# Patient Record
Sex: MALE | Race: WHITE | NOT HISPANIC OR LATINO
[De-identification: names, ages, dates, MRNs, and addresses within clinical notes are randomized per-mention and may not be internally consistent; named-entity substitution may affect disease eponyms.]

---

## 2017-01-06 ENCOUNTER — APPOINTMENT (OUTPATIENT)
Dept: INTERNAL MEDICINE | Facility: CLINIC | Age: 57
End: 2017-01-06

## 2017-01-06 ENCOUNTER — LABORATORY RESULT (OUTPATIENT)
Age: 57
End: 2017-01-06

## 2017-01-11 LAB
AMPHET UR-MCNC: NEGATIVE
BARBITURATES UR-MCNC: NEGATIVE
BENZODIAZ UR-MCNC: NEGATIVE
COCAINE METAB.OTHER UR-MCNC: NEGATIVE
CREATININE, URINE: 130.7 MG/DL
METHADONE UR-MCNC: NEGATIVE
METHAQUALONE UR-MCNC: NEGATIVE
OPIATES UR-MCNC: NEGATIVE
PCP UR-MCNC: NEGATIVE
PROPOXYPH UR QL: NEGATIVE
THC UR QL: NEGATIVE

## 2017-01-12 LAB — SUBOXONE: NORMAL

## 2017-02-08 ENCOUNTER — RX RENEWAL (OUTPATIENT)
Age: 57
End: 2017-02-08

## 2017-02-08 ENCOUNTER — APPOINTMENT (OUTPATIENT)
Dept: INTERNAL MEDICINE | Facility: CLINIC | Age: 57
End: 2017-02-08

## 2017-03-08 ENCOUNTER — APPOINTMENT (OUTPATIENT)
Dept: INTERNAL MEDICINE | Facility: CLINIC | Age: 57
End: 2017-03-08

## 2017-04-04 ENCOUNTER — APPOINTMENT (OUTPATIENT)
Dept: INTERNAL MEDICINE | Facility: CLINIC | Age: 57
End: 2017-04-04

## 2017-05-04 ENCOUNTER — APPOINTMENT (OUTPATIENT)
Dept: INTERNAL MEDICINE | Facility: CLINIC | Age: 57
End: 2017-05-04

## 2017-05-31 ENCOUNTER — LABORATORY RESULT (OUTPATIENT)
Age: 57
End: 2017-05-31

## 2017-05-31 ENCOUNTER — APPOINTMENT (OUTPATIENT)
Dept: INTERNAL MEDICINE | Facility: CLINIC | Age: 57
End: 2017-05-31

## 2017-05-31 RX ORDER — CLARITHROMYCIN 500 MG/1
500 TABLET, FILM COATED ORAL
Qty: 20 | Refills: 0 | Status: DISCONTINUED | COMMUNITY
Start: 2017-02-23

## 2017-05-31 RX ORDER — OSELTAMIVIR PHOSPHATE 75 MG/1
75 CAPSULE ORAL
Qty: 10 | Refills: 0 | Status: DISCONTINUED | COMMUNITY
Start: 2017-02-23

## 2017-06-03 LAB
AMPHET UR-MCNC: NEGATIVE
BARBITURATES UR-MCNC: NEGATIVE
BENZODIAZ UR-MCNC: NEGATIVE
COCAINE METAB.OTHER UR-MCNC: NEGATIVE
CREATININE, URINE: 32.8 MG/DL
METHADONE UR-MCNC: NEGATIVE
METHAQUALONE UR-MCNC: NEGATIVE
OPIATES UR-MCNC: NEGATIVE
PCP UR-MCNC: NEGATIVE
PROPOXYPH UR QL: NEGATIVE
THC UR QL: NEGATIVE

## 2017-06-05 LAB — SUBOXONE: NORMAL

## 2017-06-22 ENCOUNTER — RESULT REVIEW (OUTPATIENT)
Age: 57
End: 2017-06-22

## 2017-06-22 ENCOUNTER — LABORATORY RESULT (OUTPATIENT)
Age: 57
End: 2017-06-22

## 2017-06-22 ENCOUNTER — APPOINTMENT (OUTPATIENT)
Dept: DERMATOLOGY | Facility: CLINIC | Age: 57
End: 2017-06-22

## 2017-06-22 VITALS — WEIGHT: 188 LBS | HEIGHT: 67 IN | BODY MASS INDEX: 29.51 KG/M2

## 2017-06-22 VITALS — SYSTOLIC BLOOD PRESSURE: 130 MMHG | DIASTOLIC BLOOD PRESSURE: 70 MMHG

## 2017-06-27 ENCOUNTER — APPOINTMENT (OUTPATIENT)
Dept: INTERNAL MEDICINE | Facility: CLINIC | Age: 57
End: 2017-06-27

## 2017-07-24 ENCOUNTER — APPOINTMENT (OUTPATIENT)
Dept: INTERNAL MEDICINE | Facility: CLINIC | Age: 57
End: 2017-07-24

## 2017-07-24 RX ORDER — CLINDAMYCIN HYDROCHLORIDE 300 MG/1
300 CAPSULE ORAL
Qty: 28 | Refills: 0 | Status: DISCONTINUED | COMMUNITY
Start: 2017-06-21

## 2017-07-24 RX ORDER — HYDROCODONE BITARTRATE AND ACETAMINOPHEN 7.5; 325 MG/1; MG/1
7.5-325 TABLET ORAL
Qty: 10 | Refills: 0 | Status: DISCONTINUED | COMMUNITY
Start: 2017-06-21

## 2017-08-18 ENCOUNTER — APPOINTMENT (OUTPATIENT)
Dept: INTERNAL MEDICINE | Facility: CLINIC | Age: 57
End: 2017-08-18
Payer: SELF-PAY

## 2017-08-18 PROCEDURE — 99499D: CUSTOM

## 2017-09-15 ENCOUNTER — APPOINTMENT (OUTPATIENT)
Dept: INTERNAL MEDICINE | Facility: CLINIC | Age: 57
End: 2017-09-15
Payer: SELF-PAY

## 2017-09-15 PROCEDURE — 99499D: CUSTOM

## 2017-10-11 ENCOUNTER — APPOINTMENT (OUTPATIENT)
Dept: INTERNAL MEDICINE | Facility: CLINIC | Age: 57
End: 2017-10-11
Payer: SELF-PAY

## 2017-10-11 PROCEDURE — 99499D: CUSTOM

## 2017-11-02 ENCOUNTER — APPOINTMENT (OUTPATIENT)
Dept: INTERNAL MEDICINE | Facility: CLINIC | Age: 57
End: 2017-11-02
Payer: COMMERCIAL

## 2017-11-02 ENCOUNTER — NON-APPOINTMENT (OUTPATIENT)
Age: 57
End: 2017-11-02

## 2017-11-02 VITALS
OXYGEN SATURATION: 98 % | RESPIRATION RATE: 14 BRPM | TEMPERATURE: 98.2 F | WEIGHT: 194 LBS | BODY MASS INDEX: 29.4 KG/M2 | SYSTOLIC BLOOD PRESSURE: 130 MMHG | HEART RATE: 77 BPM | HEIGHT: 68 IN | DIASTOLIC BLOOD PRESSURE: 80 MMHG

## 2017-11-02 PROCEDURE — 93000 ELECTROCARDIOGRAM COMPLETE: CPT

## 2017-11-02 PROCEDURE — 99214 OFFICE O/P EST MOD 30 MIN: CPT

## 2017-11-03 LAB
ALBUMIN SERPL ELPH-MCNC: 4.6 G/DL
ALP BLD-CCNC: 102 U/L
ALT SERPL-CCNC: 25 U/L
ANION GAP SERPL CALC-SCNC: 16 MMOL/L
AST SERPL-CCNC: 26 U/L
BASOPHILS # BLD AUTO: 0.02 K/UL
BASOPHILS NFR BLD AUTO: 0.3 %
BILIRUB SERPL-MCNC: 0.3 MG/DL
BUN SERPL-MCNC: 19 MG/DL
CALCIUM SERPL-MCNC: 9.6 MG/DL
CHLORIDE SERPL-SCNC: 99 MMOL/L
CO2 SERPL-SCNC: 28 MMOL/L
CREAT SERPL-MCNC: 0.95 MG/DL
EOSINOPHIL # BLD AUTO: 0.14 K/UL
EOSINOPHIL NFR BLD AUTO: 2.2 %
GLUCOSE SERPL-MCNC: 100 MG/DL
HCT VFR BLD CALC: 46.3 %
HGB BLD-MCNC: 15.9 G/DL
IMM GRANULOCYTES NFR BLD AUTO: 0.2 %
LYMPHOCYTES # BLD AUTO: 2.59 K/UL
LYMPHOCYTES NFR BLD AUTO: 41.2 %
MAN DIFF?: NORMAL
MCHC RBC-ENTMCNC: 31.5 PG
MCHC RBC-ENTMCNC: 34.3 GM/DL
MCV RBC AUTO: 91.9 FL
MONOCYTES # BLD AUTO: 0.4 K/UL
MONOCYTES NFR BLD AUTO: 6.4 %
NEUTROPHILS # BLD AUTO: 3.13 K/UL
NEUTROPHILS NFR BLD AUTO: 49.7 %
PLATELET # BLD AUTO: 165 K/UL
POTASSIUM SERPL-SCNC: 4.8 MMOL/L
PROT SERPL-MCNC: 7.3 G/DL
RBC # BLD: 5.04 M/UL
RBC # FLD: 13.3 %
SODIUM SERPL-SCNC: 143 MMOL/L
T3 SERPL-MCNC: 130 NG/DL
T3FREE SERPL-MCNC: 3.72 PG/ML
T4 FREE SERPL-MCNC: 1.4 NG/DL
T4 SERPL-MCNC: 8.2 UG/DL
TSH SERPL-ACNC: 3.45 UIU/ML
WBC # FLD AUTO: 6.29 K/UL

## 2017-11-06 ENCOUNTER — RX RENEWAL (OUTPATIENT)
Age: 57
End: 2017-11-06

## 2017-11-06 ENCOUNTER — APPOINTMENT (OUTPATIENT)
Dept: INTERNAL MEDICINE | Facility: CLINIC | Age: 57
End: 2017-11-06
Payer: SELF-PAY

## 2017-11-06 PROCEDURE — 99499D: CUSTOM

## 2017-11-07 ENCOUNTER — NON-APPOINTMENT (OUTPATIENT)
Age: 57
End: 2017-11-07

## 2017-11-07 ENCOUNTER — APPOINTMENT (OUTPATIENT)
Dept: CARDIOLOGY | Facility: CLINIC | Age: 57
End: 2017-11-07
Payer: COMMERCIAL

## 2017-11-07 VITALS
HEIGHT: 68 IN | OXYGEN SATURATION: 99 % | WEIGHT: 194 LBS | DIASTOLIC BLOOD PRESSURE: 90 MMHG | HEART RATE: 65 BPM | SYSTOLIC BLOOD PRESSURE: 168 MMHG | BODY MASS INDEX: 29.4 KG/M2

## 2017-11-07 DIAGNOSIS — S13.4XXA SPRAIN OF LIGAMENTS OF CERVICAL SPINE, INITIAL ENCOUNTER: ICD-10-CM

## 2017-11-07 DIAGNOSIS — K21.9 GASTRO-ESOPHAGEAL REFLUX DISEASE W/OUT ESOPHAGITIS: ICD-10-CM

## 2017-11-07 DIAGNOSIS — J06.9 ACUTE UPPER RESPIRATORY INFECTION, UNSPECIFIED: ICD-10-CM

## 2017-11-07 DIAGNOSIS — D48.5 NEOPLASM OF UNCERTAIN BEHAVIOR OF SKIN: ICD-10-CM

## 2017-11-07 DIAGNOSIS — R03.0 ELEVATED BLOOD-PRESSURE READING, W/OUT DIAGNOSIS OF HYPERTENSION: ICD-10-CM

## 2017-11-07 DIAGNOSIS — R00.2 PALPITATIONS: ICD-10-CM

## 2017-11-07 DIAGNOSIS — B19.20 UNSPECIFIED VIRAL HEPATITIS C W/OUT HEPATIC COMA: ICD-10-CM

## 2017-11-07 DIAGNOSIS — M25.571 PAIN IN RIGHT ANKLE AND JOINTS OF RIGHT FOOT: ICD-10-CM

## 2017-11-07 DIAGNOSIS — F17.290 NICOTINE DEPENDENCE, OTHER TOBACCO PRODUCT, UNCOMPLICATED: ICD-10-CM

## 2017-11-07 PROCEDURE — 93000 ELECTROCARDIOGRAM COMPLETE: CPT

## 2017-11-07 PROCEDURE — 99204 OFFICE O/P NEW MOD 45 MIN: CPT

## 2017-12-04 ENCOUNTER — APPOINTMENT (OUTPATIENT)
Dept: INTERNAL MEDICINE | Facility: CLINIC | Age: 57
End: 2017-12-04
Payer: SELF-PAY

## 2017-12-04 PROCEDURE — 99499D: CUSTOM

## 2017-12-06 ENCOUNTER — APPOINTMENT (OUTPATIENT)
Dept: CARDIOLOGY | Facility: CLINIC | Age: 57
End: 2017-12-06
Payer: COMMERCIAL

## 2017-12-06 PROCEDURE — 93306 TTE W/DOPPLER COMPLETE: CPT

## 2017-12-20 ENCOUNTER — APPOINTMENT (OUTPATIENT)
Dept: CARDIOLOGY | Facility: CLINIC | Age: 57
End: 2017-12-20
Payer: COMMERCIAL

## 2017-12-20 PROCEDURE — 93224 XTRNL ECG REC UP TO 48 HRS: CPT

## 2017-12-21 ENCOUNTER — APPOINTMENT (OUTPATIENT)
Dept: CARDIOLOGY | Facility: CLINIC | Age: 57
End: 2017-12-21
Payer: COMMERCIAL

## 2017-12-21 DIAGNOSIS — R94.39 ABNORMAL RESULT OF OTHER CARDIOVASCULAR FUNCTION STUDY: ICD-10-CM

## 2017-12-21 PROCEDURE — 93015 CV STRESS TEST SUPVJ I&R: CPT

## 2017-12-29 ENCOUNTER — APPOINTMENT (OUTPATIENT)
Dept: INTERNAL MEDICINE | Facility: CLINIC | Age: 57
End: 2017-12-29
Payer: SELF-PAY

## 2017-12-29 PROCEDURE — 99499D: CUSTOM

## 2018-01-12 ENCOUNTER — APPOINTMENT (OUTPATIENT)
Dept: CARDIOLOGY | Facility: CLINIC | Age: 58
End: 2018-01-12
Payer: COMMERCIAL

## 2018-01-12 PROCEDURE — 78452 HT MUSCLE IMAGE SPECT MULT: CPT

## 2018-01-12 PROCEDURE — A9500: CPT

## 2018-01-12 PROCEDURE — 93015 CV STRESS TEST SUPVJ I&R: CPT

## 2018-01-26 ENCOUNTER — APPOINTMENT (OUTPATIENT)
Dept: INTERNAL MEDICINE | Facility: CLINIC | Age: 58
End: 2018-01-26
Payer: SELF-PAY

## 2018-01-26 PROCEDURE — 99499D: CUSTOM

## 2018-02-21 ENCOUNTER — APPOINTMENT (OUTPATIENT)
Dept: INTERNAL MEDICINE | Facility: CLINIC | Age: 58
End: 2018-02-21
Payer: SELF-PAY

## 2018-02-21 PROCEDURE — 99499D: CUSTOM

## 2018-03-20 ENCOUNTER — APPOINTMENT (OUTPATIENT)
Dept: INTERNAL MEDICINE | Facility: CLINIC | Age: 58
End: 2018-03-20
Payer: SELF-PAY

## 2018-03-20 PROCEDURE — 99499D: CUSTOM

## 2018-04-17 ENCOUNTER — APPOINTMENT (OUTPATIENT)
Dept: INTERNAL MEDICINE | Facility: CLINIC | Age: 58
End: 2018-04-17
Payer: SELF-PAY

## 2018-04-17 PROCEDURE — 99499D: CUSTOM

## 2018-05-14 ENCOUNTER — APPOINTMENT (OUTPATIENT)
Dept: INTERNAL MEDICINE | Facility: CLINIC | Age: 58
End: 2018-05-14
Payer: COMMERCIAL

## 2018-05-14 PROCEDURE — 99499D: CUSTOM

## 2018-06-08 ENCOUNTER — APPOINTMENT (OUTPATIENT)
Dept: INTERNAL MEDICINE | Facility: CLINIC | Age: 58
End: 2018-06-08
Payer: SELF-PAY

## 2018-06-08 PROCEDURE — 99499D: CUSTOM

## 2018-06-09 ENCOUNTER — MEDICATION RENEWAL (OUTPATIENT)
Age: 58
End: 2018-06-09

## 2018-06-11 ENCOUNTER — RX RENEWAL (OUTPATIENT)
Age: 58
End: 2018-06-11

## 2018-07-03 ENCOUNTER — APPOINTMENT (OUTPATIENT)
Dept: INTERNAL MEDICINE | Facility: CLINIC | Age: 58
End: 2018-07-03
Payer: COMMERCIAL

## 2018-07-03 VITALS
SYSTOLIC BLOOD PRESSURE: 130 MMHG | RESPIRATION RATE: 14 BRPM | HEIGHT: 68 IN | TEMPERATURE: 98.9 F | OXYGEN SATURATION: 98 % | BODY MASS INDEX: 28.49 KG/M2 | WEIGHT: 188 LBS | DIASTOLIC BLOOD PRESSURE: 90 MMHG | HEART RATE: 72 BPM

## 2018-07-03 DIAGNOSIS — L08.9 LOCAL INFECTION OF THE SKIN AND SUBCUTANEOUS TISSUE, UNSPECIFIED: ICD-10-CM

## 2018-07-03 DIAGNOSIS — A08.4 VIRAL INTESTINAL INFECTION, UNSPECIFIED: ICD-10-CM

## 2018-07-03 DIAGNOSIS — T30.0 BURN OF UNSPECIFIED BODY REGION, UNSPECIFIED DEGREE: ICD-10-CM

## 2018-07-03 PROCEDURE — 99214 OFFICE O/P EST MOD 30 MIN: CPT

## 2018-07-03 NOTE — PHYSICAL EXAM
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
## 2018-07-06 ENCOUNTER — APPOINTMENT (OUTPATIENT)
Dept: INTERNAL MEDICINE | Facility: CLINIC | Age: 58
End: 2018-07-06

## 2018-08-07 ENCOUNTER — APPOINTMENT (OUTPATIENT)
Dept: INTERNAL MEDICINE | Facility: CLINIC | Age: 58
End: 2018-08-07

## 2018-08-08 ENCOUNTER — APPOINTMENT (OUTPATIENT)
Dept: INTERNAL MEDICINE | Facility: CLINIC | Age: 58
End: 2018-08-08
Payer: SELF-PAY

## 2018-08-08 PROCEDURE — 99499D: CUSTOM

## 2018-09-11 ENCOUNTER — APPOINTMENT (OUTPATIENT)
Dept: INTERNAL MEDICINE | Facility: CLINIC | Age: 58
End: 2018-09-11
Payer: SELF-PAY

## 2018-09-11 PROCEDURE — 99499D: CUSTOM

## 2018-10-09 ENCOUNTER — APPOINTMENT (OUTPATIENT)
Dept: INTERNAL MEDICINE | Facility: CLINIC | Age: 58
End: 2018-10-09
Payer: SELF-PAY

## 2018-10-09 PROCEDURE — 99499D: CUSTOM

## 2018-11-14 ENCOUNTER — APPOINTMENT (OUTPATIENT)
Dept: INTERNAL MEDICINE | Facility: CLINIC | Age: 58
End: 2018-11-14
Payer: SELF-PAY

## 2018-11-14 PROCEDURE — 99499D: CUSTOM

## 2018-12-14 ENCOUNTER — APPOINTMENT (OUTPATIENT)
Dept: INTERNAL MEDICINE | Facility: CLINIC | Age: 58
End: 2018-12-14
Payer: SELF-PAY

## 2018-12-14 PROCEDURE — 99499D: CUSTOM

## 2019-01-18 ENCOUNTER — APPOINTMENT (OUTPATIENT)
Dept: INTERNAL MEDICINE | Facility: CLINIC | Age: 59
End: 2019-01-18
Payer: SELF-PAY

## 2019-01-18 PROCEDURE — 99499D: CUSTOM

## 2019-02-21 ENCOUNTER — APPOINTMENT (OUTPATIENT)
Dept: INTERNAL MEDICINE | Facility: CLINIC | Age: 59
End: 2019-02-21
Payer: SELF-PAY

## 2019-02-21 PROCEDURE — 99499D: CUSTOM

## 2019-03-07 ENCOUNTER — APPOINTMENT (OUTPATIENT)
Dept: INTERNAL MEDICINE | Facility: CLINIC | Age: 59
End: 2019-03-07
Payer: COMMERCIAL

## 2019-03-07 VITALS
RESPIRATION RATE: 14 BRPM | BODY MASS INDEX: 29.86 KG/M2 | DIASTOLIC BLOOD PRESSURE: 80 MMHG | OXYGEN SATURATION: 98 % | HEART RATE: 66 BPM | WEIGHT: 197 LBS | HEIGHT: 68 IN | SYSTOLIC BLOOD PRESSURE: 130 MMHG | TEMPERATURE: 97.9 F

## 2019-03-07 DIAGNOSIS — J01.00 ACUTE MAXILLARY SINUSITIS, UNSPECIFIED: ICD-10-CM

## 2019-03-07 DIAGNOSIS — Z00.00 ENCOUNTER FOR GENERAL ADULT MEDICAL EXAMINATION W/OUT ABNORMAL FINDINGS: ICD-10-CM

## 2019-03-07 DIAGNOSIS — R68.83 CHILLS (WITHOUT FEVER): ICD-10-CM

## 2019-03-07 LAB
FLUAV SPEC QL CULT: NORMAL
FLUBV AG SPEC QL IA: NORMAL
S PYO AG SPEC QL IA: NORMAL

## 2019-03-07 PROCEDURE — 99214 OFFICE O/P EST MOD 30 MIN: CPT | Mod: 25

## 2019-03-07 PROCEDURE — 87804 INFLUENZA ASSAY W/OPTIC: CPT | Mod: QW

## 2019-03-07 PROCEDURE — 87880 STREP A ASSAY W/OPTIC: CPT | Mod: QW

## 2019-03-07 NOTE — HISTORY OF PRESENT ILLNESS
[FreeTextEntry8] : Pt c/o body aches, chills, sore throat, cough, head congestion, sinus pressure, chest congestion for 2 days.

## 2019-03-21 ENCOUNTER — APPOINTMENT (OUTPATIENT)
Dept: INTERNAL MEDICINE | Facility: CLINIC | Age: 59
End: 2019-03-21
Payer: SELF-PAY

## 2019-03-21 PROCEDURE — 99499D: CUSTOM

## 2019-04-25 ENCOUNTER — APPOINTMENT (OUTPATIENT)
Dept: INTERNAL MEDICINE | Facility: CLINIC | Age: 59
End: 2019-04-25
Payer: SELF-PAY

## 2019-04-25 PROCEDURE — 99499D: CUSTOM

## 2019-05-24 ENCOUNTER — APPOINTMENT (OUTPATIENT)
Dept: INTERNAL MEDICINE | Facility: CLINIC | Age: 59
End: 2019-05-24
Payer: SELF-PAY

## 2019-05-24 ENCOUNTER — LABORATORY RESULT (OUTPATIENT)
Age: 59
End: 2019-05-24

## 2019-05-24 DIAGNOSIS — Z12.5 ENCOUNTER FOR SCREENING FOR MALIGNANT NEOPLASM OF PROSTATE: ICD-10-CM

## 2019-05-24 PROCEDURE — 99499D: CUSTOM

## 2019-05-25 LAB — PSA SERPL-MCNC: 0.23 NG/ML

## 2019-05-31 LAB — SUBOXONE: NORMAL

## 2019-06-04 LAB
AMPHET UR-MCNC: NEGATIVE
BARBITURATES UR-MCNC: NEGATIVE
BENZODIAZ UR-MCNC: NEGATIVE
COCAINE METAB.OTHER UR-MCNC: NEGATIVE
CREATININE, URINE: 110.7 MG/DL
METHADONE UR-MCNC: NORMAL
METHAQUALONE UR-MCNC: NEGATIVE
OPIATES UR-MCNC: NEGATIVE
PCP UR-MCNC: NEGATIVE
PROPOXYPH UR QL: NEGATIVE
THC UR QL: NEGATIVE

## 2019-06-25 ENCOUNTER — APPOINTMENT (OUTPATIENT)
Dept: INTERNAL MEDICINE | Facility: CLINIC | Age: 59
End: 2019-06-25
Payer: SELF-PAY

## 2019-06-25 PROCEDURE — 99499D: CUSTOM

## 2019-07-29 ENCOUNTER — APPOINTMENT (OUTPATIENT)
Dept: INTERNAL MEDICINE | Facility: CLINIC | Age: 59
End: 2019-07-29
Payer: SELF-PAY

## 2019-07-29 PROCEDURE — 99499D: CUSTOM

## 2019-07-29 RX ORDER — LEVOFLOXACIN 500 MG/1
500 TABLET, FILM COATED ORAL DAILY
Qty: 10 | Refills: 0 | Status: DISCONTINUED | COMMUNITY
Start: 2018-07-03 | End: 2019-07-29

## 2019-07-29 RX ORDER — PREDNISONE 20 MG/1
20 TABLET ORAL
Qty: 10 | Refills: 0 | Status: DISCONTINUED | COMMUNITY
Start: 2019-03-07 | End: 2019-07-29

## 2019-07-29 RX ORDER — CLARITHROMYCIN 500 MG/1
500 TABLET, FILM COATED ORAL TWICE DAILY
Qty: 20 | Refills: 0 | Status: DISCONTINUED | COMMUNITY
Start: 2019-03-07 | End: 2019-07-29

## 2019-07-29 RX ORDER — SILVER SULFADIAZINE 10 MG/G
1 CREAM TOPICAL TWICE DAILY
Qty: 1 | Refills: 1 | Status: DISCONTINUED | COMMUNITY
Start: 2018-07-03 | End: 2019-07-29

## 2019-08-27 ENCOUNTER — APPOINTMENT (OUTPATIENT)
Dept: INTERNAL MEDICINE | Facility: CLINIC | Age: 59
End: 2019-08-27
Payer: SELF-PAY

## 2019-08-27 PROCEDURE — 99499D: CUSTOM

## 2019-09-26 ENCOUNTER — APPOINTMENT (OUTPATIENT)
Dept: INTERNAL MEDICINE | Facility: CLINIC | Age: 59
End: 2019-09-26
Payer: SELF-PAY

## 2019-09-26 ENCOUNTER — RX RENEWAL (OUTPATIENT)
Age: 59
End: 2019-09-26

## 2019-09-26 PROCEDURE — 99499D: CUSTOM

## 2019-10-25 ENCOUNTER — APPOINTMENT (OUTPATIENT)
Dept: INTERNAL MEDICINE | Facility: CLINIC | Age: 59
End: 2019-10-25
Payer: SELF-PAY

## 2019-10-25 PROCEDURE — 99499D: CUSTOM

## 2019-11-26 ENCOUNTER — APPOINTMENT (OUTPATIENT)
Dept: INTERNAL MEDICINE | Facility: CLINIC | Age: 59
End: 2019-11-26
Payer: SELF-PAY

## 2019-11-26 PROCEDURE — 99499D: CUSTOM

## 2019-12-27 ENCOUNTER — APPOINTMENT (OUTPATIENT)
Dept: INTERNAL MEDICINE | Facility: CLINIC | Age: 59
End: 2019-12-27
Payer: SELF-PAY

## 2019-12-27 PROCEDURE — 99499D: CUSTOM

## 2020-01-07 ENCOUNTER — APPOINTMENT (OUTPATIENT)
Dept: INTERNAL MEDICINE | Facility: CLINIC | Age: 60
End: 2020-01-07
Payer: COMMERCIAL

## 2020-01-07 VITALS
BODY MASS INDEX: 29.95 KG/M2 | HEART RATE: 70 BPM | RESPIRATION RATE: 14 BRPM | TEMPERATURE: 98.1 F | OXYGEN SATURATION: 98 % | DIASTOLIC BLOOD PRESSURE: 80 MMHG | SYSTOLIC BLOOD PRESSURE: 120 MMHG | WEIGHT: 197 LBS

## 2020-01-07 DIAGNOSIS — R52 PAIN, UNSPECIFIED: ICD-10-CM

## 2020-01-07 DIAGNOSIS — J02.9 ACUTE PHARYNGITIS, UNSPECIFIED: ICD-10-CM

## 2020-01-07 DIAGNOSIS — J06.9 ACUTE UPPER RESPIRATORY INFECTION, UNSPECIFIED: ICD-10-CM

## 2020-01-07 PROCEDURE — 99214 OFFICE O/P EST MOD 30 MIN: CPT

## 2020-01-07 NOTE — PHYSICAL EXAM
[No Acute Distress] : no acute distress [Well Nourished] : well nourished [Well-Appearing] : well-appearing [Well Developed] : well developed [Normal Sclera/Conjunctiva] : normal sclera/conjunctiva [PERRL] : pupils equal round and reactive to light [EOMI] : extraocular movements intact [Normal Outer Ear/Nose] : the outer ears and nose were normal in appearance [Normal Oropharynx] : the oropharynx was normal [No JVD] : no jugular venous distention [No Lymphadenopathy] : no lymphadenopathy [Supple] : supple [Thyroid Normal, No Nodules] : the thyroid was normal and there were no nodules present [No Respiratory Distress] : no respiratory distress  [No Accessory Muscle Use] : no accessory muscle use [Clear to Auscultation] : lungs were clear to auscultation bilaterally [Normal Rate] : normal rate  [Regular Rhythm] : with a regular rhythm [Normal S1, S2] : normal S1 and S2 [No Murmur] : no murmur heard [No Carotid Bruits] : no carotid bruits [No Abdominal Bruit] : a ~M bruit was not heard ~T in the abdomen [No Varicosities] : no varicosities [Pedal Pulses Present] : the pedal pulses are present [No Edema] : there was no peripheral edema [No Palpable Aorta] : no palpable aorta [No Extremity Clubbing/Cyanosis] : no extremity clubbing/cyanosis [Soft] : abdomen soft [Non Tender] : non-tender [No Masses] : no abdominal mass palpated [Non-distended] : non-distended [No HSM] : no HSM [Normal Bowel Sounds] : normal bowel sounds [Normal Posterior Cervical Nodes] : no posterior cervical lymphadenopathy [Normal Anterior Cervical Nodes] : no anterior cervical lymphadenopathy [No CVA Tenderness] : no CVA  tenderness [No Spinal Tenderness] : no spinal tenderness [Grossly Normal Strength/Tone] : grossly normal strength/tone [No Joint Swelling] : no joint swelling [No Rash] : no rash [Coordination Grossly Intact] : coordination grossly intact [No Focal Deficits] : no focal deficits [Deep Tendon Reflexes (DTR)] : deep tendon reflexes were 2+ and symmetric [Normal Gait] : normal gait [Normal Insight/Judgement] : insight and judgment were intact [Normal Affect] : the affect was normal

## 2020-01-28 ENCOUNTER — APPOINTMENT (OUTPATIENT)
Dept: INTERNAL MEDICINE | Facility: CLINIC | Age: 60
End: 2020-01-28
Payer: SELF-PAY

## 2020-01-28 PROCEDURE — 99499D: CUSTOM

## 2020-02-25 ENCOUNTER — APPOINTMENT (OUTPATIENT)
Dept: INTERNAL MEDICINE | Facility: CLINIC | Age: 60
End: 2020-02-25

## 2020-02-26 ENCOUNTER — APPOINTMENT (OUTPATIENT)
Dept: INTERNAL MEDICINE | Facility: CLINIC | Age: 60
End: 2020-02-26
Payer: SELF-PAY

## 2020-02-26 PROCEDURE — 99499D: CUSTOM

## 2020-03-24 ENCOUNTER — APPOINTMENT (OUTPATIENT)
Dept: INTERNAL MEDICINE | Facility: CLINIC | Age: 60
End: 2020-03-24
Payer: SELF-PAY

## 2020-03-24 PROCEDURE — 99499D: CUSTOM

## 2020-04-22 ENCOUNTER — APPOINTMENT (OUTPATIENT)
Dept: INTERNAL MEDICINE | Facility: CLINIC | Age: 60
End: 2020-04-22
Payer: SELF-PAY

## 2020-04-22 PROCEDURE — 99499D: CUSTOM

## 2020-05-22 ENCOUNTER — APPOINTMENT (OUTPATIENT)
Dept: INTERNAL MEDICINE | Facility: CLINIC | Age: 60
End: 2020-05-22
Payer: SELF-PAY

## 2020-05-22 PROCEDURE — 99499D: CUSTOM

## 2020-06-30 ENCOUNTER — APPOINTMENT (OUTPATIENT)
Dept: INTERNAL MEDICINE | Facility: CLINIC | Age: 60
End: 2020-06-30
Payer: SELF-PAY

## 2020-06-30 PROCEDURE — 99499D: CUSTOM

## 2020-07-27 ENCOUNTER — APPOINTMENT (OUTPATIENT)
Dept: INTERNAL MEDICINE | Facility: CLINIC | Age: 60
End: 2020-07-27
Payer: SELF-PAY

## 2020-07-27 PROCEDURE — 99499D: CUSTOM

## 2020-08-25 ENCOUNTER — APPOINTMENT (OUTPATIENT)
Dept: INTERNAL MEDICINE | Facility: CLINIC | Age: 60
End: 2020-08-25
Payer: SELF-PAY

## 2020-08-25 PROCEDURE — 99499D: CUSTOM

## 2020-08-29 ENCOUNTER — RX RENEWAL (OUTPATIENT)
Age: 60
End: 2020-08-29

## 2020-09-29 ENCOUNTER — APPOINTMENT (OUTPATIENT)
Dept: INTERNAL MEDICINE | Facility: CLINIC | Age: 60
End: 2020-09-29
Payer: SELF-PAY

## 2020-09-29 PROCEDURE — 99499D: CUSTOM

## 2020-10-27 ENCOUNTER — APPOINTMENT (OUTPATIENT)
Dept: INTERNAL MEDICINE | Facility: CLINIC | Age: 60
End: 2020-10-27
Payer: SELF-PAY

## 2020-10-27 PROCEDURE — 99499D: CUSTOM

## 2020-11-30 ENCOUNTER — APPOINTMENT (OUTPATIENT)
Dept: INTERNAL MEDICINE | Facility: CLINIC | Age: 60
End: 2020-11-30
Payer: SELF-PAY

## 2020-11-30 PROCEDURE — 99499D: CUSTOM

## 2020-11-30 RX ORDER — AZITHROMYCIN 250 MG/1
250 TABLET, FILM COATED ORAL
Qty: 1 | Refills: 0 | Status: DISCONTINUED | COMMUNITY
Start: 2020-01-07 | End: 2020-11-30

## 2020-11-30 RX ORDER — BENZONATATE 200 MG/1
200 CAPSULE ORAL 3 TIMES DAILY
Qty: 30 | Refills: 0 | Status: DISCONTINUED | COMMUNITY
Start: 2020-01-07 | End: 2020-11-30

## 2020-12-01 ENCOUNTER — APPOINTMENT (OUTPATIENT)
Dept: INTERNAL MEDICINE | Facility: CLINIC | Age: 60
End: 2020-12-01
Payer: COMMERCIAL

## 2020-12-01 VITALS
RESPIRATION RATE: 14 BRPM | TEMPERATURE: 97.4 F | HEART RATE: 76 BPM | OXYGEN SATURATION: 98 % | DIASTOLIC BLOOD PRESSURE: 84 MMHG | SYSTOLIC BLOOD PRESSURE: 154 MMHG | BODY MASS INDEX: 29.8 KG/M2 | WEIGHT: 196 LBS

## 2020-12-01 DIAGNOSIS — Z23 ENCOUNTER FOR IMMUNIZATION: ICD-10-CM
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
## 2020-12-01 PROCEDURE — 90715 TDAP VACCINE 7 YRS/> IM: CPT

## 2020-12-01 PROCEDURE — 99072 ADDL SUPL MATRL&STAF TM PHE: CPT

## 2020-12-01 PROCEDURE — 99214 OFFICE O/P EST MOD 30 MIN: CPT | Mod: 25

## 2020-12-01 PROCEDURE — 90471 IMMUNIZATION ADMIN: CPT

## 2020-12-01 NOTE — PHYSICAL EXAM
[No Acute Distress] : no acute distress [Well Nourished] : well nourished [Well Developed] : well developed [Well-Appearing] : well-appearing [Normal Sclera/Conjunctiva] : normal sclera/conjunctiva [PERRL] : pupils equal round and reactive to light [EOMI] : extraocular movements intact [Normal Outer Ear/Nose] : the outer ears and nose were normal in appearance [Normal Oropharynx] : the oropharynx was normal [No JVD] : no jugular venous distention [No Lymphadenopathy] : no lymphadenopathy [Supple] : supple [Thyroid Normal, No Nodules] : the thyroid was normal and there were no nodules present [No Respiratory Distress] : no respiratory distress  [No Accessory Muscle Use] : no accessory muscle use [Clear to Auscultation] : lungs were clear to auscultation bilaterally [Normal Rate] : normal rate  [Regular Rhythm] : with a regular rhythm [Normal S1, S2] : normal S1 and S2 [No Murmur] : no murmur heard [No Carotid Bruits] : no carotid bruits [No Abdominal Bruit] : a ~M bruit was not heard ~T in the abdomen [No Varicosities] : no varicosities [Pedal Pulses Present] : the pedal pulses are present [No Edema] : there was no peripheral edema [No Palpable Aorta] : no palpable aorta [No Extremity Clubbing/Cyanosis] : no extremity clubbing/cyanosis [Soft] : abdomen soft [Non Tender] : non-tender [Non-distended] : non-distended [No Masses] : no abdominal mass palpated [No HSM] : no HSM [Normal Bowel Sounds] : normal bowel sounds [Normal Posterior Cervical Nodes] : no posterior cervical lymphadenopathy [Normal Anterior Cervical Nodes] : no anterior cervical lymphadenopathy [No CVA Tenderness] : no CVA  tenderness [No Spinal Tenderness] : no spinal tenderness [Grossly Normal Strength/Tone] : grossly normal strength/tone [No Rash] : no rash [Coordination Grossly Intact] : coordination grossly intact [No Focal Deficits] : no focal deficits [Normal Gait] : normal gait [Normal Affect] : the affect was normal [Normal Insight/Judgement] : insight and judgment were intact [de-identified] : Left hand with bite marks and mild swelling, erythema

## 2020-12-01 NOTE — HISTORY OF PRESENT ILLNESS
[FreeTextEntry8] : Pt was bit on the left hand by his own dog, a pit bull, last night. Pt reports that the dog bit down violently on his hand. He felt like the dog hit the bone on his left fourth digit. No fever. Pt reports that his dog is up to date on vaccinations,

## 2020-12-14 ENCOUNTER — APPOINTMENT (OUTPATIENT)
Dept: ORTHOPEDIC SURGERY | Facility: CLINIC | Age: 60
End: 2020-12-14

## 2020-12-23 PROBLEM — J02.9 ACUTE SORE THROAT: Status: RESOLVED | Noted: 2019-03-07 | Resolved: 2020-12-23

## 2020-12-23 PROBLEM — J06.9 UPPER RESPIRATORY INFECTION, ACUTE: Status: RESOLVED | Noted: 2020-01-07 | Resolved: 2020-12-23

## 2020-12-29 ENCOUNTER — APPOINTMENT (OUTPATIENT)
Dept: INTERNAL MEDICINE | Facility: CLINIC | Age: 60
End: 2020-12-29
Payer: SELF-PAY

## 2020-12-29 PROCEDURE — 99499D: CUSTOM

## 2021-01-30 ENCOUNTER — APPOINTMENT (OUTPATIENT)
Dept: INTERNAL MEDICINE | Facility: CLINIC | Age: 61
End: 2021-01-30
Payer: SELF-PAY

## 2021-01-30 PROCEDURE — 99499D: CUSTOM

## 2021-02-23 ENCOUNTER — APPOINTMENT (OUTPATIENT)
Dept: INTERNAL MEDICINE | Facility: CLINIC | Age: 61
End: 2021-02-23
Payer: SELF-PAY

## 2021-02-23 PROCEDURE — 99499D: CUSTOM

## 2021-03-31 ENCOUNTER — APPOINTMENT (OUTPATIENT)
Dept: INTERNAL MEDICINE | Facility: CLINIC | Age: 61
End: 2021-03-31
Payer: SELF-PAY

## 2021-03-31 PROCEDURE — 99499D: CUSTOM

## 2021-04-30 ENCOUNTER — APPOINTMENT (OUTPATIENT)
Dept: INTERNAL MEDICINE | Facility: CLINIC | Age: 61
End: 2021-04-30
Payer: SELF-PAY

## 2021-04-30 PROCEDURE — 99499D: CUSTOM

## 2021-06-02 ENCOUNTER — APPOINTMENT (OUTPATIENT)
Dept: INTERNAL MEDICINE | Facility: CLINIC | Age: 61
End: 2021-06-02
Payer: SELF-PAY

## 2021-06-02 PROCEDURE — 99499D: CUSTOM

## 2021-07-02 ENCOUNTER — APPOINTMENT (OUTPATIENT)
Dept: INTERNAL MEDICINE | Facility: CLINIC | Age: 61
End: 2021-07-02
Payer: SELF-PAY

## 2021-07-02 DIAGNOSIS — R21 RASH AND OTHER NONSPECIFIC SKIN ERUPTION: ICD-10-CM

## 2021-07-02 PROCEDURE — 99499D: CUSTOM

## 2021-07-02 RX ORDER — CLOTRIMAZOLE AND BETAMETHASONE DIPROPIONATE 10; .5 MG/G; MG/G
1-0.05 CREAM TOPICAL TWICE DAILY
Qty: 1 | Refills: 2 | Status: ACTIVE | COMMUNITY
Start: 2021-07-02 | End: 1900-01-01

## 2021-08-02 ENCOUNTER — APPOINTMENT (OUTPATIENT)
Dept: INTERNAL MEDICINE | Facility: CLINIC | Age: 61
End: 2021-08-02
Payer: SELF-PAY

## 2021-08-02 PROCEDURE — 99499D: CUSTOM

## 2021-08-29 ENCOUNTER — RX RENEWAL (OUTPATIENT)
Age: 61
End: 2021-08-29

## 2021-08-31 ENCOUNTER — APPOINTMENT (OUTPATIENT)
Dept: INTERNAL MEDICINE | Facility: CLINIC | Age: 61
End: 2021-08-31
Payer: SELF-PAY

## 2021-08-31 PROCEDURE — 99499D: CUSTOM

## 2021-09-29 ENCOUNTER — APPOINTMENT (OUTPATIENT)
Dept: INTERNAL MEDICINE | Facility: CLINIC | Age: 61
End: 2021-09-29
Payer: SELF-PAY

## 2021-09-29 PROCEDURE — 99499D: CUSTOM

## 2021-11-05 ENCOUNTER — APPOINTMENT (OUTPATIENT)
Dept: INTERNAL MEDICINE | Facility: CLINIC | Age: 61
End: 2021-11-05
Payer: SELF-PAY

## 2021-11-05 PROCEDURE — 99499D: CUSTOM

## 2021-12-02 ENCOUNTER — RX RENEWAL (OUTPATIENT)
Age: 61
End: 2021-12-02

## 2021-12-03 ENCOUNTER — APPOINTMENT (OUTPATIENT)
Dept: INTERNAL MEDICINE | Facility: CLINIC | Age: 61
End: 2021-12-03
Payer: SELF-PAY

## 2021-12-03 PROCEDURE — 99499D: CUSTOM

## 2022-01-04 ENCOUNTER — APPOINTMENT (OUTPATIENT)
Dept: INTERNAL MEDICINE | Facility: CLINIC | Age: 62
End: 2022-01-04
Payer: SELF-PAY

## 2022-01-04 PROCEDURE — 99499D: CUSTOM

## 2022-02-02 ENCOUNTER — APPOINTMENT (OUTPATIENT)
Dept: INTERNAL MEDICINE | Facility: CLINIC | Age: 62
End: 2022-02-02
Payer: SELF-PAY

## 2022-02-02 PROCEDURE — 99499D: CUSTOM

## 2022-03-03 ENCOUNTER — APPOINTMENT (OUTPATIENT)
Dept: INTERNAL MEDICINE | Facility: CLINIC | Age: 62
End: 2022-03-03
Payer: SELF-PAY

## 2022-03-03 PROCEDURE — 99499D: CUSTOM

## 2022-04-05 ENCOUNTER — APPOINTMENT (OUTPATIENT)
Dept: INTERNAL MEDICINE | Facility: CLINIC | Age: 62
End: 2022-04-05
Payer: SELF-PAY

## 2022-04-05 ENCOUNTER — LABORATORY RESULT (OUTPATIENT)
Age: 62
End: 2022-04-05

## 2022-04-05 DIAGNOSIS — F11.20 OPIOID DEPENDENCE, UNCOMPLICATED: ICD-10-CM

## 2022-04-05 PROCEDURE — 99499D: CUSTOM

## 2022-04-07 DIAGNOSIS — E55.9 VITAMIN D DEFICIENCY, UNSPECIFIED: ICD-10-CM

## 2022-04-07 LAB
25(OH)D3 SERPL-MCNC: 26 NG/ML
ALBUMIN SERPL ELPH-MCNC: 4.8 G/DL
ALP BLD-CCNC: 99 U/L
ALT SERPL-CCNC: 27 U/L
ANION GAP SERPL CALC-SCNC: 14 MMOL/L
APPEARANCE: CLEAR
AST SERPL-CCNC: 25 U/L
BACTERIA: NEGATIVE
BASOPHILS # BLD AUTO: 0.03 K/UL
BASOPHILS NFR BLD AUTO: 0.5 %
BILIRUB SERPL-MCNC: 0.3 MG/DL
BILIRUBIN URINE: NEGATIVE
BLOOD URINE: NEGATIVE
BUN SERPL-MCNC: 19 MG/DL
CALCIUM SERPL-MCNC: 9.3 MG/DL
CHLORIDE SERPL-SCNC: 103 MMOL/L
CHOLEST SERPL-MCNC: 202 MG/DL
CK SERPL-CCNC: 178 U/L
CO2 SERPL-SCNC: 26 MMOL/L
COLOR: YELLOW
CREAT SERPL-MCNC: 0.88 MG/DL
EGFR: 97 ML/MIN/1.73M2
EOSINOPHIL # BLD AUTO: 0.12 K/UL
EOSINOPHIL NFR BLD AUTO: 2 %
ESTIMATED AVERAGE GLUCOSE: 111 MG/DL
GLUCOSE QUALITATIVE U: NEGATIVE
GLUCOSE SERPL-MCNC: 133 MG/DL
HBA1C MFR BLD HPLC: 5.5 %
HCT VFR BLD CALC: 47.1 %
HDLC SERPL-MCNC: 43 MG/DL
HGB BLD-MCNC: 15.8 G/DL
HYALINE CASTS: 0 /LPF
IMM GRANULOCYTES NFR BLD AUTO: 0.2 %
KETONES URINE: NEGATIVE
LDLC SERPL CALC-MCNC: 126 MG/DL
LEUKOCYTE ESTERASE URINE: NEGATIVE
LYMPHOCYTES # BLD AUTO: 1.66 K/UL
LYMPHOCYTES NFR BLD AUTO: 28 %
MAN DIFF?: NORMAL
MCHC RBC-ENTMCNC: 31.5 PG
MCHC RBC-ENTMCNC: 33.5 GM/DL
MCV RBC AUTO: 93.8 FL
MICROSCOPIC-UA: NORMAL
MONOCYTES # BLD AUTO: 0.54 K/UL
MONOCYTES NFR BLD AUTO: 9.1 %
NEUTROPHILS # BLD AUTO: 3.57 K/UL
NEUTROPHILS NFR BLD AUTO: 60.2 %
NITRITE URINE: NEGATIVE
NONHDLC SERPL-MCNC: 159 MG/DL
PH URINE: 6
PLATELET # BLD AUTO: 209 K/UL
POTASSIUM SERPL-SCNC: 4.3 MMOL/L
PROT SERPL-MCNC: 7.3 G/DL
PROTEIN URINE: NORMAL
PSA SERPL-MCNC: 0.31 NG/ML
RBC # BLD: 5.02 M/UL
RBC # FLD: 12.6 %
RED BLOOD CELLS URINE: 4 /HPF
SODIUM SERPL-SCNC: 143 MMOL/L
SPECIFIC GRAVITY URINE: 1.03
SQUAMOUS EPITHELIAL CELLS: 1 /HPF
TRIGL SERPL-MCNC: 165 MG/DL
TSH SERPL-ACNC: 2.06 UIU/ML
UROBILINOGEN URINE: ABNORMAL
WBC # FLD AUTO: 5.93 K/UL
WHITE BLOOD CELLS URINE: 1 /HPF

## 2022-04-07 RX ORDER — MULTIVIT-MIN/FOLIC/VIT K/LYCOP 400-300MCG
25 MCG TABLET ORAL
Qty: 100 | Refills: 1 | Status: ACTIVE | COMMUNITY

## 2022-04-08 LAB
AMPHET UR-MCNC: NEGATIVE
BARBITURATES UR-MCNC: NEGATIVE
BENZODIAZ UR-MCNC: NEGATIVE
COCAINE METAB.OTHER UR-MCNC: NEGATIVE
CREATININE, URINE: 213.4 MG/DL
METHADONE UR-MCNC: NEGATIVE
METHAQUALONE UR-MCNC: NEGATIVE
OPIATES UR-MCNC: NEGATIVE
PCP UR-MCNC: NEGATIVE
PROPOXYPH UR QL: NEGATIVE
THC UR QL: NEGATIVE

## 2022-04-19 LAB — SUBOXONE: NORMAL

## 2022-05-04 ENCOUNTER — APPOINTMENT (OUTPATIENT)
Dept: INTERNAL MEDICINE | Facility: CLINIC | Age: 62
End: 2022-05-04
Payer: SELF-PAY

## 2022-05-04 PROCEDURE — 99499D: CUSTOM

## 2022-06-07 ENCOUNTER — APPOINTMENT (OUTPATIENT)
Dept: INTERNAL MEDICINE | Facility: CLINIC | Age: 62
End: 2022-06-07

## 2022-06-08 ENCOUNTER — APPOINTMENT (OUTPATIENT)
Dept: INTERNAL MEDICINE | Facility: CLINIC | Age: 62
End: 2022-06-08
Payer: SELF-PAY

## 2022-06-08 PROCEDURE — 99499D: CUSTOM

## 2022-07-05 ENCOUNTER — APPOINTMENT (OUTPATIENT)
Dept: INTERNAL MEDICINE | Facility: CLINIC | Age: 62
End: 2022-07-05

## 2022-07-05 PROCEDURE — 99499D: CUSTOM

## 2022-08-05 ENCOUNTER — APPOINTMENT (OUTPATIENT)
Dept: INTERNAL MEDICINE | Facility: CLINIC | Age: 62
End: 2022-08-05

## 2022-08-05 PROCEDURE — 99499D: CUSTOM

## 2022-09-06 ENCOUNTER — APPOINTMENT (OUTPATIENT)
Dept: INTERNAL MEDICINE | Facility: CLINIC | Age: 62
End: 2022-09-06

## 2022-09-06 PROCEDURE — 99499D: CUSTOM

## 2022-10-04 ENCOUNTER — APPOINTMENT (OUTPATIENT)
Dept: INTERNAL MEDICINE | Facility: CLINIC | Age: 62
End: 2022-10-04

## 2022-10-04 PROCEDURE — 99499D: CUSTOM

## 2022-11-02 ENCOUNTER — APPOINTMENT (OUTPATIENT)
Dept: INTERNAL MEDICINE | Facility: CLINIC | Age: 62
End: 2022-11-02

## 2022-11-02 PROCEDURE — 99499D: CUSTOM

## 2022-11-28 ENCOUNTER — EMERGENCY (EMERGENCY)
Facility: HOSPITAL | Age: 62
LOS: 1 days | Discharge: ANOTHER HOSPITAL | End: 2022-11-28
Attending: STUDENT IN AN ORGANIZED HEALTH CARE EDUCATION/TRAINING PROGRAM | Admitting: STUDENT IN AN ORGANIZED HEALTH CARE EDUCATION/TRAINING PROGRAM
Payer: COMMERCIAL

## 2022-11-28 ENCOUNTER — NON-APPOINTMENT (OUTPATIENT)
Age: 62
End: 2022-11-28

## 2022-11-28 ENCOUNTER — INPATIENT (INPATIENT)
Facility: HOSPITAL | Age: 62
LOS: 1 days | Discharge: HOME | DRG: 246 | End: 2022-11-30
Attending: INTERNAL MEDICINE | Admitting: INTERNAL MEDICINE
Payer: COMMERCIAL

## 2022-11-28 VITALS
HEART RATE: 78 BPM | RESPIRATION RATE: 15 BRPM | SYSTOLIC BLOOD PRESSURE: 174 MMHG | WEIGHT: 190.04 LBS | DIASTOLIC BLOOD PRESSURE: 98 MMHG | OXYGEN SATURATION: 97 % | HEIGHT: 67 IN | TEMPERATURE: 99 F

## 2022-11-28 VITALS
OXYGEN SATURATION: 98 % | SYSTOLIC BLOOD PRESSURE: 166 MMHG | RESPIRATION RATE: 15 BRPM | TEMPERATURE: 99 F | DIASTOLIC BLOOD PRESSURE: 77 MMHG | HEART RATE: 75 BPM

## 2022-11-28 VITALS — HEIGHT: 68 IN | WEIGHT: 190.04 LBS

## 2022-11-28 DIAGNOSIS — I21.4 NON-ST ELEVATION (NSTEMI) MYOCARDIAL INFARCTION: ICD-10-CM

## 2022-11-28 LAB
ALBUMIN SERPL ELPH-MCNC: 3.7 G/DL — SIGNIFICANT CHANGE UP (ref 3.3–5)
ALP SERPL-CCNC: 87 U/L — SIGNIFICANT CHANGE UP (ref 40–120)
ALT FLD-CCNC: 50 U/L — SIGNIFICANT CHANGE UP (ref 12–78)
ANION GAP SERPL CALC-SCNC: 6 MMOL/L — SIGNIFICANT CHANGE UP (ref 5–17)
APTT BLD: 32 SEC — SIGNIFICANT CHANGE UP (ref 27.5–35.5)
AST SERPL-CCNC: 32 U/L — SIGNIFICANT CHANGE UP (ref 15–37)
BASOPHILS # BLD AUTO: 0.02 K/UL — SIGNIFICANT CHANGE UP (ref 0–0.2)
BASOPHILS NFR BLD AUTO: 0.3 % — SIGNIFICANT CHANGE UP (ref 0–2)
BILIRUB SERPL-MCNC: 0.4 MG/DL — SIGNIFICANT CHANGE UP (ref 0.2–1.2)
BUN SERPL-MCNC: 21 MG/DL — SIGNIFICANT CHANGE UP (ref 7–23)
CALCIUM SERPL-MCNC: 8.8 MG/DL — SIGNIFICANT CHANGE UP (ref 8.5–10.1)
CHLORIDE SERPL-SCNC: 105 MMOL/L — SIGNIFICANT CHANGE UP (ref 96–108)
CK MB BLD-MCNC: 3.3 % — SIGNIFICANT CHANGE UP (ref 0–3.5)
CK MB CFR SERPL CALC: 4.4 NG/ML — HIGH (ref 0–3.6)
CK SERPL-CCNC: 132 U/L — SIGNIFICANT CHANGE UP (ref 26–308)
CO2 SERPL-SCNC: 29 MMOL/L — SIGNIFICANT CHANGE UP (ref 22–31)
CREAT SERPL-MCNC: 0.96 MG/DL — SIGNIFICANT CHANGE UP (ref 0.5–1.3)
EGFR: 89 ML/MIN/1.73M2 — SIGNIFICANT CHANGE UP
EOSINOPHIL # BLD AUTO: 0.08 K/UL — SIGNIFICANT CHANGE UP (ref 0–0.5)
EOSINOPHIL NFR BLD AUTO: 1.4 % — SIGNIFICANT CHANGE UP (ref 0–6)
GLUCOSE SERPL-MCNC: 130 MG/DL — HIGH (ref 70–99)
HCT VFR BLD CALC: 42.8 % — SIGNIFICANT CHANGE UP (ref 39–50)
HGB BLD-MCNC: 14.9 G/DL — SIGNIFICANT CHANGE UP (ref 13–17)
IMM GRANULOCYTES NFR BLD AUTO: 0.3 % — SIGNIFICANT CHANGE UP (ref 0–0.9)
INR BLD: 1.08 RATIO — SIGNIFICANT CHANGE UP (ref 0.88–1.16)
LIDOCAIN IGE QN: 91 U/L — SIGNIFICANT CHANGE UP (ref 73–393)
LYMPHOCYTES # BLD AUTO: 1.66 K/UL — SIGNIFICANT CHANGE UP (ref 1–3.3)
LYMPHOCYTES # BLD AUTO: 28.1 % — SIGNIFICANT CHANGE UP (ref 13–44)
MCHC RBC-ENTMCNC: 31.2 PG — SIGNIFICANT CHANGE UP (ref 27–34)
MCHC RBC-ENTMCNC: 34.8 GM/DL — SIGNIFICANT CHANGE UP (ref 32–36)
MCV RBC AUTO: 89.7 FL — SIGNIFICANT CHANGE UP (ref 80–100)
MONOCYTES # BLD AUTO: 0.42 K/UL — SIGNIFICANT CHANGE UP (ref 0–0.9)
MONOCYTES NFR BLD AUTO: 7.1 % — SIGNIFICANT CHANGE UP (ref 2–14)
NEUTROPHILS # BLD AUTO: 3.71 K/UL — SIGNIFICANT CHANGE UP (ref 1.8–7.4)
NEUTROPHILS NFR BLD AUTO: 62.8 % — SIGNIFICANT CHANGE UP (ref 43–77)
NRBC # BLD: 0 /100 WBCS — SIGNIFICANT CHANGE UP (ref 0–0)
PLATELET # BLD AUTO: 192 K/UL — SIGNIFICANT CHANGE UP (ref 150–400)
POTASSIUM SERPL-MCNC: 4 MMOL/L — SIGNIFICANT CHANGE UP (ref 3.5–5.3)
POTASSIUM SERPL-SCNC: 4 MMOL/L — SIGNIFICANT CHANGE UP (ref 3.5–5.3)
PROT SERPL-MCNC: 7.3 G/DL — SIGNIFICANT CHANGE UP (ref 6–8.3)
PROTHROM AB SERPL-ACNC: 12.6 SEC — SIGNIFICANT CHANGE UP (ref 10.5–13.4)
RBC # BLD: 4.77 M/UL — SIGNIFICANT CHANGE UP (ref 4.2–5.8)
RBC # FLD: 12.2 % — SIGNIFICANT CHANGE UP (ref 10.3–14.5)
SARS-COV-2 RNA SPEC QL NAA+PROBE: SIGNIFICANT CHANGE UP
SODIUM SERPL-SCNC: 140 MMOL/L — SIGNIFICANT CHANGE UP (ref 135–145)
TROPONIN I, HIGH SENSITIVITY RESULT: 716.7 NG/L — HIGH
WBC # BLD: 5.91 K/UL — SIGNIFICANT CHANGE UP (ref 3.8–10.5)
WBC # FLD AUTO: 5.91 K/UL — SIGNIFICANT CHANGE UP (ref 3.8–10.5)

## 2022-11-28 PROCEDURE — 71046 X-RAY EXAM CHEST 2 VIEWS: CPT | Mod: 26

## 2022-11-28 PROCEDURE — 99285 EMERGENCY DEPT VISIT HI MDM: CPT

## 2022-11-28 PROCEDURE — 85730 THROMBOPLASTIN TIME PARTIAL: CPT

## 2022-11-28 PROCEDURE — 83690 ASSAY OF LIPASE: CPT

## 2022-11-28 PROCEDURE — 93010 ELECTROCARDIOGRAM REPORT: CPT

## 2022-11-28 PROCEDURE — 96374 THER/PROPH/DIAG INJ IV PUSH: CPT

## 2022-11-28 PROCEDURE — U0003: CPT

## 2022-11-28 PROCEDURE — 85025 COMPLETE CBC W/AUTO DIFF WBC: CPT

## 2022-11-28 PROCEDURE — 93005 ELECTROCARDIOGRAM TRACING: CPT

## 2022-11-28 PROCEDURE — 71046 X-RAY EXAM CHEST 2 VIEWS: CPT

## 2022-11-28 PROCEDURE — 99285 EMERGENCY DEPT VISIT HI MDM: CPT | Mod: 25

## 2022-11-28 PROCEDURE — 96376 TX/PRO/DX INJ SAME DRUG ADON: CPT

## 2022-11-28 PROCEDURE — 84484 ASSAY OF TROPONIN QUANT: CPT

## 2022-11-28 PROCEDURE — 82550 ASSAY OF CK (CPK): CPT

## 2022-11-28 PROCEDURE — 85610 PROTHROMBIN TIME: CPT

## 2022-11-28 PROCEDURE — 36415 COLL VENOUS BLD VENIPUNCTURE: CPT

## 2022-11-28 PROCEDURE — 82553 CREATINE MB FRACTION: CPT

## 2022-11-28 PROCEDURE — 80053 COMPREHEN METABOLIC PANEL: CPT

## 2022-11-28 PROCEDURE — U0005: CPT

## 2022-11-28 RX ORDER — INFLUENZA VIRUS VACCINE 15; 15; 15; 15 UG/.5ML; UG/.5ML; UG/.5ML; UG/.5ML
0.5 SUSPENSION INTRAMUSCULAR ONCE
Refills: 0 | Status: DISCONTINUED | OUTPATIENT
Start: 2022-11-28 | End: 2022-11-30

## 2022-11-28 RX ORDER — TICAGRELOR 90 MG/1
180 TABLET ORAL ONCE
Refills: 0 | Status: COMPLETED | OUTPATIENT
Start: 2022-11-28 | End: 2022-11-28

## 2022-11-28 RX ORDER — ASPIRIN/CALCIUM CARB/MAGNESIUM 324 MG
325 TABLET ORAL ONCE
Refills: 0 | Status: COMPLETED | OUTPATIENT
Start: 2022-11-28 | End: 2022-11-28

## 2022-11-28 RX ORDER — HEPARIN SODIUM 5000 [USP'U]/ML
INJECTION INTRAVENOUS; SUBCUTANEOUS
Qty: 25000 | Refills: 0 | Status: DISCONTINUED | OUTPATIENT
Start: 2022-11-28 | End: 2022-11-29

## 2022-11-28 RX ORDER — HEPARIN SODIUM 5000 [USP'U]/ML
5000 INJECTION INTRAVENOUS; SUBCUTANEOUS ONCE
Refills: 0 | Status: COMPLETED | OUTPATIENT
Start: 2022-11-28 | End: 2022-11-28

## 2022-11-28 RX ORDER — HEPARIN SODIUM 5000 [USP'U]/ML
INJECTION INTRAVENOUS; SUBCUTANEOUS
Qty: 25000 | Refills: 0 | Status: DISCONTINUED | OUTPATIENT
Start: 2022-11-28 | End: 2022-12-01

## 2022-11-28 RX ORDER — HEPARIN SODIUM 5000 [USP'U]/ML
5300 INJECTION INTRAVENOUS; SUBCUTANEOUS EVERY 6 HOURS
Refills: 0 | Status: DISCONTINUED | OUTPATIENT
Start: 2022-11-28 | End: 2022-11-29

## 2022-11-28 RX ADMIN — HEPARIN SODIUM 1000 UNIT(S)/HR: 5000 INJECTION INTRAVENOUS; SUBCUTANEOUS at 21:36

## 2022-11-28 RX ADMIN — HEPARIN SODIUM 5000 UNIT(S): 5000 INJECTION INTRAVENOUS; SUBCUTANEOUS at 14:02

## 2022-11-28 RX ADMIN — HEPARIN SODIUM 1000 UNIT(S)/HR: 5000 INJECTION INTRAVENOUS; SUBCUTANEOUS at 18:02

## 2022-11-28 RX ADMIN — Medication 325 MILLIGRAM(S): at 13:16

## 2022-11-28 RX ADMIN — TICAGRELOR 180 MILLIGRAM(S): 90 TABLET ORAL at 14:02

## 2022-11-28 NOTE — CONSULT NOTE ADULT - PROVIDER SPECIALTY LIST ADULT
Initially admitted to the pediatric hospitalist service on 2/6/20 for workup of emesis and weight loss. She then was noted to have increased seizure activity (likely due to her not tolerating her anti-epileptic medications) therefore a rapid response was called and she was transferred to the PICU for further management. She was loaded with phenobarbital and demonstrated noted improvement since that time. She was being managed on high-flow oxygen (due to increased frequency of her seizures) with no desaturations noted and her oxygen support was weaned back down to room air. Once medically stable, she was stepped down to the pediatric hospitalist service on 02/10. She completed a 7-day course of clindamycin due to concern of potential aspiration pneumonia, started on 2/7.     Vomiting:  Elizabeth underwent a GI w/u due to her h/o vomiting; EGD was performed and revealed mild gastritis; she was started on a PPI while in the hospital. She also had injection of her left vocal cord by Peds ENT under anesthesia at that time. On 02/14 she had a g-tube placed by Peds Surgery; this was done due to concern that she vomits up a lot of her medications and this is worsening her seizures. There is also some thought that her vomiting may be behavioral as she doesn't want to take her meds. Emesis seems to have resolved; patient sent home on her home Zantac.    Seizure control:  Seizure control improved on phenobarbital. She was weaned off her diazepam due to concerns about her AED's making her too sleepy. Phenobarbital was also decreased slowly and at time of discharge she was on 40 mg BID. Dr. Louise, Peds Neuro, following patient closely. She interrogated the patient's VNS on day of discharge. She also recommends a slow wean of phenobarbital, as mom feels this is making her sleepy, with plan to decrease dosing by 10 mg every 5 days; patient to go home on phenobarbital, 70 mg QHS. Patient should f/u with Dr. Louise in clinic;  Cardiology Dr. Louise would also like her to establish at [facility]'s as she frequently goes into status and needs to be managed more closely by an epileptologist.     Nutrition/G-tube:  Patient had resumed her normal diet at time of discharge, with no vomiting. Mom is advised to give Boost BID-QID via g-tube if she notes decreased PO intake. She should have her weights followed outpatient by her PCP. Mom is to give all meds via G-tube.

## 2022-11-28 NOTE — PATIENT PROFILE ADULT - FALL HARM RISK - HARM RISK INTERVENTIONS

## 2022-11-28 NOTE — ED PROVIDER NOTE - OBJECTIVE STATEMENT
63 yo M PMHx opioid abuse, GERD presents to ED c/o midsternal chest pain x this AM. Symptoms began around 7 AM while walking- sudden tightness/pressure, pain radiating into his neck. Symptoms improved gradually with rest. Pt reports intermittent chest pain x the last few days, admittedly worse with exertion. Pt denies pleuritic pain, SOB, back pain, abd pain, N/V, numbness/tingling, recent travel, calf pain, fever/chills.

## 2022-11-28 NOTE — ED PROVIDER NOTE - CLINICAL SUMMARY MEDICAL DECISION MAKING FREE TEXT BOX
I, Damien Iyer MD, have seen and examined the patient on the date of service.  I agree with the JAVY's assessment as written, with exceptions or additions as noted below or in a separate note.  Patient with a past medical history of GERD is presenting with chest pain.  Intermittent over the past week.  Worsens with walking or walking up steps.  Improved with rest.  No associated pleuritic symptoms, nausea, vomiting, diaphoresis.  No leg swelling or leg pain.  Saw a cardiologist 5 years ago but has not seen him since.  States he is scheduled for stress test within the next couple of weeks.  No focal findings on exam.  Assessment and plan: We will evaluate for ACS.  Do not suspect PE as he is not tachycardic, hypoxic or having any pleuritic symptoms.  With his history of GERD, possibility of symptoms though less likely given his symptoms appear to be more exertional.  Less likely pneumothorax as well.  Plan for lab work, x-ray, cardiology consult.

## 2022-11-28 NOTE — CONSULT NOTE ADULT - ASSESSMENT
62 year old male with pmhx of opiod abuse, Hep C, GERD presents with midsternal cp starting at 7 AM this morning.      - Patient complaining of a 2 week hx of cp, worsening with exertion but also present at rest   - Follows with Cardiologist Dr. Donovan  - Last stress test in 2018 shows: 10 Mets + 2-2.5 mm ST depression in inferior leads V5-V6 at peak exercise, with 1 min recovery time   - Holter monitor in 2017 showed rare ventricular premature contractions, one isolated supraventricular premature contraction and one rare brief pause   - EKG shows NSR, f/u troponins   - Fu outpatient for repeat stress test as presentation points to stable vs unstable angina     - Last Echo in 12/2017 shows EF of 60% + mitral regurgitation   - No meaningful evidence of volume overload.                 62 year old male with pmhx of opiod abuse, Hep C, GERD presents with midsternal cp starting at 7 AM this morning.      - Patient complaining of a 2 week hx of cp, worsening with exertion but also present at rest   - Follows with Cardiologist Dr. Donovan  - Last stress test in 2018 shows: 10 Mets + 2-2.5 mm ST depression in inferior leads V5-V6 at peak exercise, with 1 min recovery time   - Holter monitor in 2017 showed rare ventricular premature contractions, one isolated supraventricular premature contraction and one rare brief pause   - EKG shows NSR, no ST segment elevation   - Troponins 716, CKMB= 4.4; F/u serial cardiac enzymes and serial ekgs   - Fu outpatient for repeat stress test as presentation points to stable vs unstable angina     - Last Echo in 12/2017 shows EF of 60% + mitral regurgitation   - No meaningful evidence of volume overload.                 62 year old male with pmhx of opiod abuse, Hep C, GERD presents with midsternal cp starting at 7 AM this morning.      - Patient complaining of a 2 week hx of cp, worsening with exertion but also present at rest   - Follows with Cardiologist Dr. Donovan  - Last stress test in 2018 shows: 10 Mets + 2-2.5 mm ST depression in inferior leads V5-V6 at peak exercise, with 1 min recovery time   - Holter monitor in 2017 showed rare ventricular premature contractions, one isolated supraventricular premature contraction and one rare brief pause   - EKG shows NSR, no ST segment elevation   - Troponins 716, CKMB= 4.4  - Likely experiencing NSTEMI given presentation and elevated cardiac enzymes   - S/p aspirin 325x1  - Heparin 5000 units IV and 180 mg Brilinta ordered  - Will transfer to [place] for urgent angiogram                    62 year old male with pmhx of opiod abuse, Hep C, GERD presents with midsternal cp starting at 7 AM this morning.    - Patient complaining of a 2 week hx of cp, worsening with exertion but also present at rest   - Follows with Cardiologist Dr. Donovan  - Last stress test in 2018 shows: 10 Mets + 2-2.5 mm ST depression in inferior leads V5-V6 at peak exercise, with 1 min recovery time   - Holter monitor in 2017 showed rare ventricular premature contractions, one isolated supraventricular premature contraction and one rare brief pause   - EKG shows NSR, no ST segment elevation   - Troponins 716, CKMB= 4.4  - Likely NSTEMI given presentation and elevated cardiac enzymes   - S/p aspirin 325x1  - Heparin 5000 units IV and 180 mg Brilinta ordered  - Will transfer to [place] for urgent angiogram

## 2022-11-28 NOTE — CONSULT NOTE ADULT - ATTENDING COMMENTS
recent symptoms suggestive of unstable angina, now w/ troponin elevation and nstemi  asa, Brilinta, and heparin bolus/gtt  covid pcr sent  for transfer to  for cardiac catheterization

## 2022-11-28 NOTE — ED ADULT NURSE NOTE - OBJECTIVE STATEMENT
Pt a/ox4, reports chest discomfort has resolved while resting in stretcher.  Reports increased chest pain prior to visit.  NSR on CM, no other signs of acute distress noted.

## 2022-11-28 NOTE — H&P CARDIOLOGY - DATE:
This patient has been identified as a low or moderate risk for unplanned readmission risk score for the BPCI-A program. The patient will be placed in paused status and monitored for 90 days. In the event of unplanned readmission, the patient will then be assigned to the BPCI-A nurse.        28-Nov-2022

## 2022-11-28 NOTE — CONSULT NOTE ADULT - SUBJECTIVE AND OBJECTIVE BOX
[facility]         Zion Gil, Edgardo, Andriy, Ashwin, Sujey Keen        [phone] (office)    CHIEF COMPLAINT: Patient is a 62y old  Male who presents with a chief complaint of     HPI:  62 year old male with pmhx of opiod abuse, Hep C, GERD presents with midsternal cp starting at 7 AM this morning. The pain started while walking from his truck it improved after he stopped walking. It radiated to his neck and shoulder blades. He describes it as a burning sensation as if "someone put a cup of hot water on his chest." States that that he has been having daily episodes of chest pain for the past 2 weeks. They range in severity. Some occur during exertion but he also experiences cp while sleeping or watching TV. The episode lasts around 20 minutes. It can be up to an 8.5/10 in pain severity. He saw Dr. Donovan cardiologist in 2017 and 2018. He had a Hoelter monitor put in for palpitations with exertion and had a stress test done as well. He denies sob and any swelling. He does have occasional palpitations.  PAST MEDICAL & SURGICAL HISTORY:  GERD (gastroesophageal reflux disease)          SOCIAL HISTORY: No active tobacco, alcohol or illicit drug use.    FAMILY HISTORY:      Home Medications:  Pantoprazole     MEDICATIONS  (STANDING):    MEDICATIONS  (PRN):      Allergies    penicillin (Hives)    Intolerances        REVIEW OF SYSTEMS: + cp, + palpitations, - sob, - edema, - orthopnea     VITAL SIGNS:   Vital Signs Last 24 Hrs  T(C): 37.2 (28 Nov 2022 11:14), Max: 37.2 (28 Nov 2022 11:14)  T(F): 99 (28 Nov 2022 11:14), Max: 99 (28 Nov 2022 11:14)  HR: 78 (28 Nov 2022 11:14) (78 - 78)  BP: 174/98 (28 Nov 2022 11:14) (174/98 - 174/98)  BP(mean): --  RR: 15 (28 Nov 2022 11:14) (15 - 15)  SpO2: 97% (28 Nov 2022 11:14) (97% - 97%)    Parameters below as of 28 Nov 2022 11:14  Patient On (Oxygen Delivery Method): room air        I&O's Summary      PHYSICAL EXAM:  Constitutional: NAD, awake and alert, well-developed  Eyes: EOMI, no oral cyanosis, conjunctivae clear, anicteric  Pulmonary: Non-labored, breath sounds are clear bilaterally, no wheezing, rales or rhonchi  Cardiovascular:RRR. + murmur   Gastrointestinal: Bowel Sounds present, soft, nontender  Lymph: No peripheral edema   Neurological: Alert, strength and sensitivity are grossly intact  Skin: Warm, well-perfused  Psych: Mood & affect appropriate    LABS: All Labs Reviewed:                        14.9   5.91  )-----------( 192      ( 28 Nov 2022 12:10 )             42.8     28 Nov 2022 12:10    140    |  105    |  21     ----------------------------<  130    4.0     |  29     |  0.96     Ca    8.8        28 Nov 2022 12:10    TPro  7.3    /  Alb  3.7    /  TBili  0.4    /  DBili  x      /  AST  32     /  ALT  50     /  AlkPhos  87     28 Nov 2022 12:10          Blood Culture:         RADIOLOGY:    EKG: NSR

## 2022-11-28 NOTE — H&P CARDIOLOGY - HISTORY OF PRESENT ILLNESS
62 year old male, pmhx of opioid abuse, Hep C, GERD, presents with midsternal chest pain starting at 07:00 on 11/28/22. The pain radiated to his neck and shoulder blades, started while walking from his truck, it improved after he stopped walking, described as a burning sensation as if "someone put a cup of hot water on my chest." He reports having daily chest pain episodes for the past 2 weeks that range in severity, lasting up to 20 minutes, some occur during exertion but he also while sleeping or watching TV. Pain ranges up to an 8.5/10 in severity. He saw Dr. Donovan cardiologist in 2017 and 2018. He had a stress test done and a Holter monitor put on for palpitations with exertion. He denies SOB swelling. Patient agrees with transfer to [facility] for cardiac cath on 11/29/22, Heparin GTT started.

## 2022-11-29 DIAGNOSIS — R07.9 CHEST PAIN, UNSPECIFIED: ICD-10-CM

## 2022-11-29 PROBLEM — K21.9 GASTRO-ESOPHAGEAL REFLUX DISEASE WITHOUT ESOPHAGITIS: Chronic | Status: ACTIVE | Noted: 2022-11-28

## 2022-11-29 LAB
APTT BLD: 52 SEC — HIGH (ref 27.5–35.5)
APTT BLD: 61.9 SEC — HIGH (ref 27.5–35.5)
HCT VFR BLD CALC: 42.3 % — SIGNIFICANT CHANGE UP (ref 39–50)
HGB BLD-MCNC: 14.8 G/DL — SIGNIFICANT CHANGE UP (ref 13–17)
MCHC RBC-ENTMCNC: 31.3 PG — SIGNIFICANT CHANGE UP (ref 27–34)
MCHC RBC-ENTMCNC: 35 GM/DL — SIGNIFICANT CHANGE UP (ref 32–36)
MCV RBC AUTO: 89.4 FL — SIGNIFICANT CHANGE UP (ref 80–100)
NRBC # BLD: 0 /100 WBCS — SIGNIFICANT CHANGE UP (ref 0–0)
PLATELET # BLD AUTO: 175 K/UL — SIGNIFICANT CHANGE UP (ref 150–400)
RBC # BLD: 4.73 M/UL — SIGNIFICANT CHANGE UP (ref 4.2–5.8)
RBC # FLD: 12.2 % — SIGNIFICANT CHANGE UP (ref 10.3–14.5)
WBC # BLD: 4.87 K/UL — SIGNIFICANT CHANGE UP (ref 3.8–10.5)
WBC # FLD AUTO: 4.87 K/UL — SIGNIFICANT CHANGE UP (ref 3.8–10.5)

## 2022-11-29 PROCEDURE — 99222 1ST HOSP IP/OBS MODERATE 55: CPT | Mod: 25

## 2022-11-29 PROCEDURE — 99152 MOD SED SAME PHYS/QHP 5/>YRS: CPT

## 2022-11-29 PROCEDURE — 93010 ELECTROCARDIOGRAM REPORT: CPT

## 2022-11-29 PROCEDURE — 92928 PRQ TCAT PLMT NTRAC ST 1 LES: CPT | Mod: LD

## 2022-11-29 PROCEDURE — 93454 CORONARY ARTERY ANGIO S&I: CPT | Mod: 26,59

## 2022-11-29 RX ORDER — ASPIRIN/CALCIUM CARB/MAGNESIUM 324 MG
81 TABLET ORAL DAILY
Refills: 0 | Status: DISCONTINUED | OUTPATIENT
Start: 2022-11-30 | End: 2022-11-30

## 2022-11-29 RX ORDER — ACETAMINOPHEN 500 MG
650 TABLET ORAL EVERY 6 HOURS
Refills: 0 | Status: DISCONTINUED | OUTPATIENT
Start: 2022-11-29 | End: 2022-11-30

## 2022-11-29 RX ORDER — TICAGRELOR 90 MG/1
90 TABLET ORAL EVERY 12 HOURS
Refills: 0 | Status: DISCONTINUED | OUTPATIENT
Start: 2022-11-29 | End: 2022-11-30

## 2022-11-29 RX ORDER — ATORVASTATIN CALCIUM 80 MG/1
80 TABLET, FILM COATED ORAL AT BEDTIME
Refills: 0 | Status: DISCONTINUED | OUTPATIENT
Start: 2022-11-29 | End: 2022-11-30

## 2022-11-29 RX ORDER — SODIUM CHLORIDE 9 MG/ML
1000 INJECTION INTRAMUSCULAR; INTRAVENOUS; SUBCUTANEOUS
Refills: 0 | Status: DISCONTINUED | OUTPATIENT
Start: 2022-11-29 | End: 2022-11-30

## 2022-11-29 RX ORDER — PANTOPRAZOLE SODIUM 20 MG/1
1 TABLET, DELAYED RELEASE ORAL
Qty: 0 | Refills: 0 | DISCHARGE

## 2022-11-29 RX ADMIN — HEPARIN SODIUM 1200 UNIT(S)/HR: 5000 INJECTION INTRAVENOUS; SUBCUTANEOUS at 05:35

## 2022-11-29 RX ADMIN — Medication 650 MILLIGRAM(S): at 17:00

## 2022-11-29 RX ADMIN — TICAGRELOR 90 MILLIGRAM(S): 90 TABLET ORAL at 21:37

## 2022-11-29 RX ADMIN — Medication 650 MILLIGRAM(S): at 17:30

## 2022-11-29 RX ADMIN — SODIUM CHLORIDE 100 MILLILITER(S): 9 INJECTION INTRAMUSCULAR; INTRAVENOUS; SUBCUTANEOUS at 18:38

## 2022-11-29 RX ADMIN — ATORVASTATIN CALCIUM 80 MILLIGRAM(S): 80 TABLET, FILM COATED ORAL at 21:36

## 2022-11-29 NOTE — CONSULT NOTE ADULT - SUBJECTIVE AND OBJECTIVE BOX
[facility] - Andriy Donovan, Ashwin, Sujey Keen  Office Number: [phone]    Initial Consult Note    CHIEF COMPLAINT: Patient is a 62y old  Male who presents with a chief complaint of chest pain (29 Nov 2022 03:23)      HPI:  62 year old male, pmhx of opioid abuse, Hep C, GERD, presents with midsternal chest pain starting at 07:00 on 11/28/22. The pain radiated to his neck and shoulder blades, started while walking from his truck, it improved after he stopped walking, described as a burning sensation as if "someone put a cup of hot water on my chest." He reports having daily chest pain episodes for the past 2 weeks that range in severity, lasting up to 20 minutes, some occur during exertion but he also while sleeping or watching TV. Pain ranges up to an 8.5/10 in severity. He saw Dr. Donovan cardiologist in 2017 and 2018. He had a stress test done and a Holter monitor put on for palpitations with exertion. He denies SOB swelling. Patient agrees with transfer to [facility] for cardiac cath on 11/29/22, Heparin GTT started. (28 Nov 2022 21:15)      PAST MEDICAL & SURGICAL HISTORY:  GERD (gastroesophageal reflux disease)          SOCIAL HISTORY:  No tobacco, ethanol, opiod abuse; does vap    FAMILY HISTORY:  + CAD/CABG at premature age in mother    MEDICATIONS  (STANDING):  heparin  Infusion.  Unit(s)/Hr (10 mL/Hr) IV Continuous <Continuous>  influenza   Vaccine 0.5 milliLiter(s) IntraMuscular once    MEDICATIONS  (PRN):  heparin   Injectable 5300 Unit(s) IV Push every 6 hours PRN For aPTT less than 40      Allergies    penicillin (Hives)    Intolerances        REVIEW OF SYSTEMS:    CONSTITUTIONAL: No weakness, fevers or chills  EYES/ENT: No visual changes;  No vertigo or throat pain   NECK: No pain or stiffness  RESPIRATORY: No cough, wheezing, hemoptysis; No shortness of breath  CARDIOVASCULAR: No chest pain or palpitations  GASTROINTESTINAL: No abdominal pain. No nausea, vomiting, or hematemesis; No diarrhea or constipation. No melena or hematochezia.  GENITOURINARY: No dysuria, frequency or hematuria  NEUROLOGICAL: No numbness or weakness  SKIN: No itching or rash  All other review of systems is negative unless indicated above    VITAL SIGNS:   Vital Signs Last 24 Hrs  T(C): 36.7 (29 Nov 2022 04:18), Max: 37.2 (28 Nov 2022 11:14)  T(F): 98 (29 Nov 2022 04:18), Max: 99 (28 Nov 2022 11:14)  HR: 64 (29 Nov 2022 04:18) (64 - 78)  BP: 133/61 (29 Nov 2022 04:18) (133/61 - 174/98)  BP(mean): 82 (29 Nov 2022 04:18) (82 - 82)  RR: 18 (29 Nov 2022 04:18) (15 - 18)  SpO2: 98% (29 Nov 2022 04:18) (97% - 98%)    Parameters below as of 29 Nov 2022 04:18  Patient On (Oxygen Delivery Method): room air        I&O's Summary    29 Nov 2022 07:01  -  29 Nov 2022 08:30  --------------------------------------------------------  IN: 0 mL / OUT: 0 mL / NET: 0 mL        On Exam:    Constitutional: NAD, alert and oriented x 3  Lungs:  Non-labored, breath sounds are clear bilaterally, No wheezing, rales or rhonchi  Cardiovascular: RRR.  S1 and S2 positive.  No murmurs, rubs, gallops or clicks  Gastrointestinal: Bowel Sounds present, soft, nontender.   Lymph: No peripheral edema. No cervical lymphadenopathy.  Neurological: Alert, no focal deficits  Skin: No rashes or ulcers   Psych:  Mood & affect appropriate.    LABS: All Labs Reviewed:                        14.8   4.87  )-----------( 175      ( 29 Nov 2022 04:59 )             42.3                         14.9   5.91  )-----------( 192      ( 28 Nov 2022 12:10 )             42.8     28 Nov 2022 12:10    140    |  105    |  21     ----------------------------<  130    4.0     |  29     |  0.96     Ca    8.8        28 Nov 2022 12:10    TPro  7.3    /  Alb  3.7    /  TBili  0.4    /  DBili  x      /  AST  32     /  ALT  50     /  AlkPhos  87     28 Nov 2022 12:10    PT/INR - ( 28 Nov 2022 12:10 )   PT: 12.6 sec;   INR: 1.08 ratio         PTT - ( 29 Nov 2022 04:59 )  PTT:52.0 sec  CARDIAC MARKERS ( 28 Nov 2022 12:10 )  x     / x     / 132 U/L / x     / 4.4 ng/mL      Blood Culture:         RADIOLOGY:    EKG: sr, nsst abn  tele: sr

## 2022-11-29 NOTE — CONSULT NOTE ADULT - ASSESSMENT
Jacques is a 62 year old male with reflux and opioid abuse, here with exertional chest tightness and an nstemi.    - chest pain free this am  - s/p asa and brilinta, and currently on a heparin gtt  - lipitor 80  - plan for cardiac cath this am    - remains in a sr on telemetry and would add bb later today  - no evidence of volume overload.   - echocardiogram    - trend cr and electrolytes. Keep K>4, mg>2  - will follow with you

## 2022-11-30 ENCOUNTER — TRANSCRIPTION ENCOUNTER (OUTPATIENT)
Age: 62
End: 2022-11-30

## 2022-11-30 VITALS
TEMPERATURE: 98 F | RESPIRATION RATE: 17 BRPM | HEART RATE: 67 BPM | OXYGEN SATURATION: 96 % | SYSTOLIC BLOOD PRESSURE: 131 MMHG | DIASTOLIC BLOOD PRESSURE: 74 MMHG

## 2022-11-30 DIAGNOSIS — I10 ESSENTIAL (PRIMARY) HYPERTENSION: ICD-10-CM

## 2022-11-30 DIAGNOSIS — I25.10 ATHEROSCLEROTIC HEART DISEASE OF NATIVE CORONARY ARTERY WITHOUT ANGINA PECTORIS: ICD-10-CM

## 2022-11-30 DIAGNOSIS — E78.5 HYPERLIPIDEMIA, UNSPECIFIED: ICD-10-CM

## 2022-11-30 LAB
ANION GAP SERPL CALC-SCNC: 10 MMOL/L — SIGNIFICANT CHANGE UP (ref 5–17)
BUN SERPL-MCNC: 16 MG/DL — SIGNIFICANT CHANGE UP (ref 7–23)
CALCIUM SERPL-MCNC: 9 MG/DL — SIGNIFICANT CHANGE UP (ref 8.4–10.5)
CHLORIDE SERPL-SCNC: 104 MMOL/L — SIGNIFICANT CHANGE UP (ref 96–108)
CO2 SERPL-SCNC: 26 MMOL/L — SIGNIFICANT CHANGE UP (ref 22–31)
CREAT SERPL-MCNC: 0.76 MG/DL — SIGNIFICANT CHANGE UP (ref 0.5–1.3)
EGFR: 101 ML/MIN/1.73M2 — SIGNIFICANT CHANGE UP
GLUCOSE SERPL-MCNC: 116 MG/DL — HIGH (ref 70–99)
POTASSIUM SERPL-MCNC: 3.8 MMOL/L — SIGNIFICANT CHANGE UP (ref 3.5–5.3)
POTASSIUM SERPL-SCNC: 3.8 MMOL/L — SIGNIFICANT CHANGE UP (ref 3.5–5.3)
SODIUM SERPL-SCNC: 140 MMOL/L — SIGNIFICANT CHANGE UP (ref 135–145)

## 2022-11-30 PROCEDURE — 93005 ELECTROCARDIOGRAM TRACING: CPT

## 2022-11-30 PROCEDURE — 36415 COLL VENOUS BLD VENIPUNCTURE: CPT

## 2022-11-30 PROCEDURE — 85027 COMPLETE CBC AUTOMATED: CPT

## 2022-11-30 PROCEDURE — C9600: CPT | Mod: LC

## 2022-11-30 PROCEDURE — C1769: CPT

## 2022-11-30 PROCEDURE — 85730 THROMBOPLASTIN TIME PARTIAL: CPT

## 2022-11-30 PROCEDURE — 80048 BASIC METABOLIC PNL TOTAL CA: CPT

## 2022-11-30 PROCEDURE — 93454 CORONARY ARTERY ANGIO S&I: CPT | Mod: 59

## 2022-11-30 PROCEDURE — C1725: CPT

## 2022-11-30 PROCEDURE — C1894: CPT

## 2022-11-30 PROCEDURE — C1874: CPT

## 2022-11-30 PROCEDURE — C8929: CPT

## 2022-11-30 PROCEDURE — 93010 ELECTROCARDIOGRAM REPORT: CPT

## 2022-11-30 PROCEDURE — C1887: CPT

## 2022-11-30 PROCEDURE — 99152 MOD SED SAME PHYS/QHP 5/>YRS: CPT

## 2022-11-30 PROCEDURE — 99232 SBSQ HOSP IP/OBS MODERATE 35: CPT

## 2022-11-30 PROCEDURE — 93306 TTE W/DOPPLER COMPLETE: CPT | Mod: 26

## 2022-11-30 PROCEDURE — 92928 PRQ TCAT PLMT NTRAC ST 1 LES: CPT | Mod: RC

## 2022-11-30 RX ORDER — TICAGRELOR 90 MG/1
1 TABLET ORAL
Qty: 60 | Refills: 0
Start: 2022-11-30 | End: 2022-12-29

## 2022-11-30 RX ORDER — ASPIRIN/CALCIUM CARB/MAGNESIUM 324 MG
1 TABLET ORAL
Qty: 0 | Refills: 0 | DISCHARGE
Start: 2022-11-30

## 2022-11-30 RX ORDER — TICAGRELOR 90 MG/1
1 TABLET ORAL
Qty: 180 | Refills: 3
Start: 2022-11-30 | End: 2023-11-24

## 2022-11-30 RX ORDER — ATORVASTATIN CALCIUM 80 MG/1
1 TABLET, FILM COATED ORAL
Qty: 30 | Refills: 1
Start: 2022-11-30 | End: 2023-01-28

## 2022-11-30 RX ADMIN — Medication 81 MILLIGRAM(S): at 06:49

## 2022-11-30 RX ADMIN — TICAGRELOR 90 MILLIGRAM(S): 90 TABLET ORAL at 06:49

## 2022-11-30 NOTE — DISCHARGE NOTE PROVIDER - CARE PROVIDER_API CALL
Alexx Rm)  Cardiovascular Disease; Internal Medicine  [address], [place] [id number]  Phone: [phone]  Fax: [phone]  Follow Up Time:

## 2022-11-30 NOTE — PROGRESS NOTE ADULT - ASSESSMENT
63 y/o male with above pmhx, awaiting cardiac cath on 11/29.
63 y/o male with above pmhx, stents to the mid LAD and prox circ on 11/29, staged PCI on 11/30.
Jacques is a 62 year old male with reflux and opioid abuse, here with exertional chest tightness and an nstemi.    - chest pain free this am  - s/p pci to the LAD and LCX yesterday.  For staged PCI to the RCA today  - Continue DAPT with aspirin and Brilinta  - lipitor 80       - remains in a sr on telemetry and would add bb later today  - no evidence of volume overload.   - check echocardiogram    - trend cr and electrolytes. Keep K>4, mg>2  - will follow with you

## 2022-11-30 NOTE — PROGRESS NOTE ADULT - SUBJECTIVE AND OBJECTIVE BOX
HPI:  62 year old male, pmhx of opioid abuse, Hep C, GERD, presents with midsternal chest pain starting at 07:00 on 11/28/22. The pain radiated to his neck and shoulder blades, started while walking from his truck, it improved after he stopped walking, described as a burning sensation as if "someone put a cup of hot water on my chest." He reports having daily chest pain episodes for the past 2 weeks that range in severity, lasting up to 20 minutes, some occur during exertion but he also while sleeping or watching TV. Pain ranges up to an 8.5/10 in severity. He saw Dr. Donovan cardiologist in 2017 and 2018. He had a stress test done and a Holter monitor put on for palpitations with exertion. He denies SOB swelling. Patient agrees with transfer to [facility] for cardiac cath on 11/29/22, Heparin GTT started. (28 Nov 2022 21:15)    Patient is a 62y old  Male who presents with a chief complaint of chest pain (29 Nov 2022 03:23)          Allergies    penicillin (Hives)    Intolerances        Medications:  acetaminophen     Tablet .. 650 milliGRAM(s) Oral every 6 hours PRN  aspirin enteric coated 81 milliGRAM(s) Oral daily  atorvastatin 80 milliGRAM(s) Oral at bedtime  influenza   Vaccine 0.5 milliLiter(s) IntraMuscular once  sodium chloride 0.9%. 1000 milliLiter(s) IV Continuous <Continuous>  ticagrelor 90 milliGRAM(s) Oral every 12 hours      Vitals:  T(C): 36.6 (11-29-22 @ 13:45), Max: 36.7 (11-29-22 @ 04:18)  HR: 64 (11-29-22 @ 21:15) (61 - 79)  BP: 109/67 (11-29-22 @ 21:15) (109/67 - 150/75)  BP(mean): 81 (11-29-22 @ 19:15) (79 - 99)  RR: 17 (11-29-22 @ 21:15) (14 - 18)  SpO2: 93% (11-29-22 @ 21:15) (92% - 98%)  Wt(kg): --  Daily     Daily   I&O's Summary    29 Nov 2022 07:01  -  30 Nov 2022 01:36  --------------------------------------------------------  IN: 720 mL / OUT: 300 mL / NET: 420 mL          Physical Exam:  Appearance: Normal  Eyes: PERRL, EOMI  HENT: Normal oral muscosa, NC/AT  Cardiovascular: S1S2, RRR, No M/R/G, no JVD, No Lower extremity edema  Procedural Access Site: No hematoma, Non-tender to palpation, 2+ pulse, No bruit, No Ecchymosis  Respiratory: Clear to auscultation bilaterally  Gastrointestinal: Soft, Non tender, Normal Bowel Sounds  Musculoskeletal: No clubbing, No joint deformity   Neurologic: Non-focal  Lymphatic: No lymphadenopathy  Psychiatry: AAOx3, Mood & affect appropriate  Skin: No rashes, No ecchymoses, No cyanosis    11-28    140  |  105  |  21  ----------------------------<  130<H>  4.0   |  29  |  0.96    Ca    8.8      28 Nov 2022 12:10    TPro  7.3  /  Alb  3.7  /  TBili  0.4  /  DBili  x   /  AST  32  /  ALT  50  /  AlkPhos  87  11-28    PT/INR - ( 28 Nov 2022 12:10 )   PT: 12.6 sec;   INR: 1.08 ratio         PTT - ( 29 Nov 2022 11:48 )  PTT:61.9 sec  CARDIAC MARKERS ( 28 Nov 2022 12:10 )  x     / x     / 132 U/L / x     / 4.4 ng/mL        Lipid panel   Hgb A1c                         14.8   4.87  )-----------( 175      ( 29 Nov 2022 04:59 )             42.3         
[facility] - Andriy Donovan, Osmani Christopher Savella, [place]  Office Number:  [phone]    Patient resting comfortably in bed in NAD.  Laying flat with no respiratory distress.  No complaints of chest pain, dyspnea, palpitations, PND, or orthopnea.    F/U for:  CAD    Telemetry:  SR    MEDICATIONS  (STANDING):  aspirin enteric coated 81 milliGRAM(s) Oral daily  atorvastatin 80 milliGRAM(s) Oral at bedtime  influenza   Vaccine 0.5 milliLiter(s) IntraMuscular once  sodium chloride 0.9%. 1000 milliLiter(s) (100 mL/Hr) IV Continuous <Continuous>  ticagrelor 90 milliGRAM(s) Oral every 12 hours    MEDICATIONS  (PRN):  acetaminophen     Tablet .. 650 milliGRAM(s) Oral every 6 hours PRN Moderate Pain (4 - 6)      Allergies    penicillin (Hives)             Vital Signs Last 24 Hrs  T(C): 36.7 (30 Nov 2022 04:22), Max: 36.7 (30 Nov 2022 04:22)  T(F): 98 (30 Nov 2022 04:22), Max: 98 (30 Nov 2022 04:22)  HR: 85 (30 Nov 2022 04:22) (61 - 85)  BP: 131/64 (30 Nov 2022 04:22) (109/67 - 150/75)  BP(mean): 82 (30 Nov 2022 04:22) (79 - 99)  RR: 17 (30 Nov 2022 04:22) (14 - 18)  SpO2: 99% (30 Nov 2022 04:22) (92% - 99%)    Parameters below as of 30 Nov 2022 04:22  Patient On (Oxygen Delivery Method): room air        I&O's Summary    29 Nov 2022 07:01  -  30 Nov 2022 06:57  --------------------------------------------------------  IN: 720 mL / OUT: 300 mL / NET: 420 mL        ON EXAM:    General: NAD, awake and alert, oriented x 3  HEENT: Mucous membranes are moist, anicteric  Lungs: Non-labored, breath sounds are clear bilaterally, No wheezing, rales or rhonchi  Cardiovascular: Regular, S1 and S2, no murmurs, rubs, or gallops  Gastrointestinal: Bowel Sounds present, soft, nontender.   Lymph: No peripheral edema. No lymphadenopathy.  Skin: No rashes or ulcers  Psych:  Mood & affect appropriate    LABS: All Labs Reviewed:                        14.8   4.87  )-----------( 175      ( 29 Nov 2022 04:59 )             42.3                         14.9   5.91  )-----------( 192      ( 28 Nov 2022 12:10 )             42.8     30 Nov 2022 05:36    140    |  104    |  16     ----------------------------<  116    3.8     |  26     |  0.76   28 Nov 2022 12:10    140    |  105    |  21     ----------------------------<  130    4.0     |  29     |  0.96     Ca    9.0        30 Nov 2022 05:36  Ca    8.8        28 Nov 2022 12:10    TPro  7.3    /  Alb  3.7    /  TBili  0.4    /  DBili  x      /  AST  32     /  ALT  50     /  AlkPhos  87     28 Nov 2022 12:10    PT/INR - ( 28 Nov 2022 12:10 )   PT: 12.6 sec;   INR: 1.08 ratio         PTT - ( 29 Nov 2022 11:48 )  PTT:61.9 sec  CARDIAC MARKERS ( 28 Nov 2022 12:10 )  x     / x     / 132 U/L / x     / 4.4 ng/mL      Blood Culture:       
HPI:  62 year old male, pmhx of opioid abuse, Hep C, GERD, presents with midsternal chest pain starting at 07:00 on 11/28/22. The pain radiated to his neck and shoulder blades, started while walking from his truck, it improved after he stopped walking, described as a burning sensation as if "someone put a cup of hot water on my chest." He reports having daily chest pain episodes for the past 2 weeks that range in severity, lasting up to 20 minutes, some occur during exertion but he also while sleeping or watching TV. Pain ranges up to an 8.5/10 in severity. He saw Dr. Donovan cardiologist in 2017 and 2018. He had a stress test done and a Holter monitor put on for palpitations with exertion. He denies SOB swelling. Patient agrees with transfer to [facility] for cardiac cath on 11/29/22, Heparin GTT started. (28 Nov 2022 21:15)

## 2022-11-30 NOTE — DISCHARGE NOTE PROVIDER - NSDCMRMEDTOKEN_GEN_ALL_CORE_FT
pantoprazole 40 mg oral delayed release tablet: 1 tab(s) orally once a day   aspirin 81 mg oral delayed release tablet: 1 tab(s) orally once a day  atorvastatin 80 mg oral tablet: 1 tab(s) orally once a day (at bedtime)  pantoprazole 40 mg oral delayed release tablet: 1 tab(s) orally once a day  ticagrelor 90 mg oral tablet: 1 tab(s) orally every 12 hours

## 2022-11-30 NOTE — DISCHARGE NOTE PROVIDER - NSDCCPTREATMENT_GEN_ALL_CORE_FT
PRINCIPAL PROCEDURE  Procedure: Left heart cardiac cath  Findings and Treatment: No heavy lifting for 2 weeks, no strenuous activity  ( pushing/ pulling) no driving for x 2 days,  you may shower 24 hours following procedure but no bathing or swimming for x1  week, no strenuous sex for x 1 week & follow up with your cardiologist in 1-2 week  aspirin and brilinta do not stop these meds unless directed by cardiologist

## 2022-11-30 NOTE — DISCHARGE NOTE PROVIDER - HOSPITAL COURSE
HPI:  62 year old male, pmhx of opioid abuse, Hep C, GERD, presents with midsternal chest pain starting at 07:00 on 11/28/22. The pain radiated to his neck and shoulder blades, started while walking from his truck, it improved after he stopped walking, described as a burning sensation as if "someone put a cup of hot water on my chest." He reports having daily chest pain episodes for the past 2 weeks that range in severity, lasting up to 20 minutes, some occur during exertion but he also while sleeping or watching TV. Pain ranges up to an 8.5/10 in severity. He saw Dr. Donovan cardiologist in 2017 and 2018. He had a stress test done and a Holter monitor put on for palpitations with exertion. He denies SOB swelling. Patient agrees with transfer to [facility] for cardiac cath on 11/29/22, Heparin GTT started. (28 Nov 2022 21:15).  11/29 [facility] - LAD & CIC  staged on 11/30 to RCA

## 2022-11-30 NOTE — DISCHARGE NOTE PROVIDER - NSDCCPCAREPLAN_GEN_ALL_CORE_FT
PRINCIPAL DISCHARGE DIAGNOSIS  Diagnosis: CAD (coronary artery disease)  Assessment and Plan of Treatment: Low salt, low fat diet.   Weight management.   Take medications as prescribed.    No smoking.  Follow up appointments with your doctor(s)  as instruced.

## 2022-11-30 NOTE — DISCHARGE NOTE NURSING/CASE MANAGEMENT/SOCIAL WORK - PATIENT PORTAL LINK FT
You can access the FollowMyHealth Patient Portal offered by [facility] by registering at the following website: [email or web address]. By joining [employer]’s FollowMyHealth portal, you will also be able to view your health information using other applications (apps) compatible with our system.

## 2022-11-30 NOTE — DISCHARGE NOTE NURSING/CASE MANAGEMENT/SOCIAL WORK - NSDCPEFALRISK_GEN_ALL_CORE
For information on Fall & Injury Prevention, visit: [email or web address] OR  [email or web address] OR  https://www.cdc.gov/steadi/patient.html

## 2022-11-30 NOTE — PROGRESS NOTE ADULT - PROBLEM SELECTOR PLAN 1
cardiac cath 11/29  telemetry monitoring  Heparin GTT  serial EKG
Staged PCI 11/30  Monitor radial site for swelling, bleeding  Continue ASA, Brilinta

## 2022-12-01 ENCOUNTER — NON-APPOINTMENT (OUTPATIENT)
Age: 62
End: 2022-12-01

## 2022-12-06 ENCOUNTER — APPOINTMENT (OUTPATIENT)
Dept: INTERNAL MEDICINE | Facility: CLINIC | Age: 62
End: 2022-12-06

## 2022-12-06 PROCEDURE — 99499D: CUSTOM

## 2022-12-09 RX ORDER — ASPIRIN ENTERIC COATED TABLETS 81 MG 81 MG/1
81 TABLET, DELAYED RELEASE ORAL
Refills: 0 | Status: ACTIVE | COMMUNITY

## 2022-12-16 ENCOUNTER — APPOINTMENT (OUTPATIENT)
Dept: CARDIOLOGY | Facility: CLINIC | Age: 62
End: 2022-12-16

## 2022-12-16 ENCOUNTER — NON-APPOINTMENT (OUTPATIENT)
Age: 62
End: 2022-12-16

## 2022-12-16 VITALS
HEIGHT: 68 IN | WEIGHT: 191 LBS | HEART RATE: 77 BPM | OXYGEN SATURATION: 96 % | DIASTOLIC BLOOD PRESSURE: 89 MMHG | BODY MASS INDEX: 28.95 KG/M2 | SYSTOLIC BLOOD PRESSURE: 135 MMHG

## 2022-12-16 PROCEDURE — 93000 ELECTROCARDIOGRAM COMPLETE: CPT

## 2022-12-16 PROCEDURE — 99214 OFFICE O/P EST MOD 30 MIN: CPT | Mod: 25

## 2022-12-16 RX ORDER — ASPIRIN 81 MG
81 TABLET, DELAYED RELEASE (ENTERIC COATED) ORAL
Refills: 0 | Status: ACTIVE | COMMUNITY

## 2022-12-16 NOTE — DISCUSSION/SUMMARY
[FreeTextEntry1] : Jacques recently presented with chest pain, and was found to have multivessel CAD.  He is now status post PCI to his LAD and left circumflex, followed by staged procedure to his RCA.\par \par He feels quite well today without worrisome symptoms.  His EKG demonstrates a sinus rhythm, without obvious ischemia or chamber enlargement.  His echocardiogram demonstrated normal left ventricular systolic function.\par \par He is getting back to life and work.  He will remain on aspirin, Brilinta, and Lipitor 80.  I have added Toprol-XL 25 to his regimen.  He will have a full set of blood work prior to his next visit in 2 months.  He and his wife know to call if any issues or concerns. [EKG obtained to assist in diagnosis and management of assessed problem(s)] : EKG obtained to assist in diagnosis and management of assessed problem(s)

## 2022-12-16 NOTE — HISTORY OF PRESENT ILLNESS
[FreeTextEntry1] : Jacques is a 62-year-old male here for posthospitalization follow-up.\par \par His past medical history is notable for opioid abuse, hepatitis C, and reflux.  He presented emergency room on 11/28/2022 with midsternal chest pain and an NSTEMI.  He was transferred to [place] for cardiac catheterization, and is now status post drug-eluting stent to his LAD (x 2) and left circumflex artery, and a staged procedure on 11/30 to his RCA (PCI x 2).\par \par He is doing well today.  He has no chest pain, difficulty breathing, or palpitations.  He is taking his medications as prescribed.  His most recent LDL was 126, and he is now on Lipitor 80.  He is back to work, and has no worrisome symptoms.  His echocardiogram in the hospital demonstrated normal left ventricular systolic function.  He did have a single episode of a strange feeling in his chest when he pushed to urinate.

## 2023-01-04 ENCOUNTER — APPOINTMENT (OUTPATIENT)
Dept: INTERNAL MEDICINE | Facility: CLINIC | Age: 63
End: 2023-01-04
Payer: SELF-PAY

## 2023-01-04 PROCEDURE — 99499D: CUSTOM

## 2023-01-30 RX ORDER — TICAGRELOR 90 MG/1
90 TABLET ORAL TWICE DAILY
Qty: 180 | Refills: 3 | Status: ACTIVE | COMMUNITY

## 2023-01-30 NOTE — ED ADULT TRIAGE NOTE - MODE OF ARRIVAL
The following changes were made to your medications today:   REDUCE hydralazine 5 mg twice daily   REDUCE furosemide to 20 mg daily  RECUCE potassium to 10mEq daily   Continue all present medications     The following lifestyle modifications are encouraged:  DRINK MORE FLUIDS - 48-64 ounces of water (1.5 to 2 liters)  Continue regular exercise at least 30 minutes daily.  Lowfat/Low Cholesterol diet advised.      The following tests have been ordered:  none     Follow up with Ailyn in 4-6 weeks  Return to Clinic in 6 months or sooner if needed.   
Walk in

## 2023-02-02 RX ORDER — DOXYCYCLINE HYCLATE 100 MG/1
100 CAPSULE ORAL
Qty: 20 | Refills: 0 | Status: DISCONTINUED | COMMUNITY
Start: 2020-12-01 | End: 2023-02-02

## 2023-02-03 ENCOUNTER — APPOINTMENT (OUTPATIENT)
Dept: INTERNAL MEDICINE | Facility: CLINIC | Age: 63
End: 2023-02-03
Payer: SELF-PAY

## 2023-02-03 PROCEDURE — 99499D: CUSTOM

## 2023-02-24 ENCOUNTER — NON-APPOINTMENT (OUTPATIENT)
Age: 63
End: 2023-02-24

## 2023-02-24 ENCOUNTER — APPOINTMENT (OUTPATIENT)
Dept: CARDIOLOGY | Facility: CLINIC | Age: 63
End: 2023-02-24
Payer: COMMERCIAL

## 2023-02-24 VITALS
HEIGHT: 66 IN | OXYGEN SATURATION: 96 % | BODY MASS INDEX: 30.37 KG/M2 | WEIGHT: 189 LBS | HEART RATE: 59 BPM | SYSTOLIC BLOOD PRESSURE: 151 MMHG | DIASTOLIC BLOOD PRESSURE: 80 MMHG

## 2023-02-24 VITALS — SYSTOLIC BLOOD PRESSURE: 138 MMHG | DIASTOLIC BLOOD PRESSURE: 78 MMHG

## 2023-02-24 DIAGNOSIS — R07.89 OTHER CHEST PAIN: ICD-10-CM

## 2023-02-24 PROCEDURE — 99214 OFFICE O/P EST MOD 30 MIN: CPT | Mod: 25

## 2023-02-24 PROCEDURE — 93000 ELECTROCARDIOGRAM COMPLETE: CPT

## 2023-02-24 NOTE — HISTORY OF PRESENT ILLNESS
[FreeTextEntry1] : Jacques is a 62-year-old male here for posthospitalization follow-up.\par \par His past medical history is notable for opioid abuse, hepatitis C, and reflux.  He presented emergency room on 11/28/2022 with midsternal chest pain and an NSTEMI.  He was transferred to [place] for cardiac catheterization, and is now status post drug-eluting stent to his LAD (x 2) and left circumflex artery, and a staged procedure on 11/30 to his RCA (PCI x 2).\par \par I last saw him in 12/22.\par \par He is doing well today.  He has no chest pain, difficulty breathing, or palpitations.  He is taking his medications as prescribed.  His most recent LDL was 126, and he is now on Lipitor 80.  He is back to work, and has no worrisome symptoms.  His echocardiogram in the hospital demonstrated normal left ventricular systolic function.  \par He is also toprol XL. He has been at the gym and feels well.

## 2023-02-24 NOTE — DISCUSSION/SUMMARY
[FreeTextEntry1] : Jacques recently presented with chest pain, and was found to have multivessel CAD.  He is now status post PCI to his LAD and left circumflex, followed by staged procedure to his RCA.\par \par He feels quite well today without worrisome symptoms.  His EKG demonstrates a sinus rhythm, without obvious ischemia or chamber enlargement.  His echocardiogram demonstrated normal left ventricular systolic function.\par \par He is getting back to life and work.  He will remain on aspirin, Brilinta, and Lipitor 80, along with Toprol. His BP is a little elevated, and he is going to purchase a cuff and monitor his numbers at home. He will also have a full set of BW. He is going to watch his salt intake.\par \par If no issues, I will see him again in 3 months. [EKG obtained to assist in diagnosis and management of assessed problem(s)] : EKG obtained to assist in diagnosis and management of assessed problem(s)

## 2023-03-06 ENCOUNTER — APPOINTMENT (OUTPATIENT)
Dept: INTERNAL MEDICINE | Facility: CLINIC | Age: 63
End: 2023-03-06
Payer: SELF-PAY

## 2023-03-06 ENCOUNTER — LABORATORY RESULT (OUTPATIENT)
Age: 63
End: 2023-03-06

## 2023-03-06 VITALS — DIASTOLIC BLOOD PRESSURE: 80 MMHG | SYSTOLIC BLOOD PRESSURE: 130 MMHG

## 2023-03-06 PROCEDURE — 99499D: CUSTOM

## 2023-03-08 LAB
AMPHET UR-MCNC: NEGATIVE NG/ML
BARBITURATES UR-MCNC: NEGATIVE NG/ML
BENZODIAZ UR-MCNC: NEGATIVE NG/ML
COCAINE METAB.OTHER UR-MCNC: NEGATIVE NG/ML
CREATININE, URINE: 128 MG/DL
FENTANYL, URINE: NEGATIVE NG/ML
METHADONE UR-MCNC: NEGATIVE NG/ML
OPIATES UR-MCNC: NEGATIVE NG/ML
OXYCODONE/OXYMORPHONE, URINE: NEGATIVE NG/ML
PCP UR-MCNC: NEGATIVE NG/ML
PH, URINE: 5.8
PLEASE NOTE: DRUGSCRUR: NORMAL
THC UR QL: NEGATIVE NG/ML

## 2023-03-13 LAB — SUBOXONE: NORMAL

## 2023-03-15 LAB
ALBUMIN SERPL ELPH-MCNC: 4.3 G/DL
ALP BLD-CCNC: 87 U/L
ALT SERPL-CCNC: 50 U/L
ANION GAP SERPL CALC-SCNC: 9 MMOL/L
APPEARANCE: CLEAR
AST SERPL-CCNC: 32 U/L
BACTERIA: NEGATIVE
BASOPHILS # BLD AUTO: 0.02 K/UL
BASOPHILS NFR BLD AUTO: 0.4 %
BILIRUB SERPL-MCNC: 0.5 MG/DL
BILIRUBIN URINE: NEGATIVE
BLOOD URINE: ABNORMAL
BUN SERPL-MCNC: 19 MG/DL
CALCIUM SERPL-MCNC: 9.3 MG/DL
CHLORIDE SERPL-SCNC: 104 MMOL/L
CHOLEST SERPL-MCNC: 103 MG/DL
CO2 SERPL-SCNC: 30 MMOL/L
COLOR: YELLOW
CREAT SERPL-MCNC: 0.91 MG/DL
EGFR: 95 ML/MIN/1.73M2
EOSINOPHIL # BLD AUTO: 0.25 K/UL
EOSINOPHIL NFR BLD AUTO: 4.9 %
ESTIMATED AVERAGE GLUCOSE: 114 MG/DL
GLUCOSE QUALITATIVE U: NEGATIVE
GLUCOSE SERPL-MCNC: 105 MG/DL
HBA1C MFR BLD HPLC: 5.6 %
HCT VFR BLD CALC: 44.3 %
HDLC SERPL-MCNC: 49 MG/DL
HGB BLD-MCNC: 15.1 G/DL
HYALINE CASTS: 1 /LPF
IMM GRANULOCYTES NFR BLD AUTO: 0.2 %
KETONES URINE: NEGATIVE
LDLC SERPL CALC-MCNC: 46 MG/DL
LEUKOCYTE ESTERASE URINE: NEGATIVE
LYMPHOCYTES # BLD AUTO: 1.64 K/UL
LYMPHOCYTES NFR BLD AUTO: 32 %
MAN DIFF?: NORMAL
MCHC RBC-ENTMCNC: 31.7 PG
MCHC RBC-ENTMCNC: 34.1 GM/DL
MCV RBC AUTO: 93.1 FL
MICROSCOPIC-UA: NORMAL
MONOCYTES # BLD AUTO: 0.51 K/UL
MONOCYTES NFR BLD AUTO: 10 %
NEUTROPHILS # BLD AUTO: 2.69 K/UL
NEUTROPHILS NFR BLD AUTO: 52.5 %
NITRITE URINE: NEGATIVE
NONHDLC SERPL-MCNC: 54 MG/DL
PH URINE: 6
PLATELET # BLD AUTO: 158 K/UL
POTASSIUM SERPL-SCNC: 4.3 MMOL/L
PROT SERPL-MCNC: 6.6 G/DL
PROTEIN URINE: NORMAL
RBC # BLD: 4.76 M/UL
RBC # FLD: 13.1 %
RED BLOOD CELLS URINE: 4 /HPF
SODIUM SERPL-SCNC: 144 MMOL/L
SPECIFIC GRAVITY URINE: 1.02
SQUAMOUS EPITHELIAL CELLS: 0 /HPF
TRIGL SERPL-MCNC: 39 MG/DL
TSH SERPL-ACNC: 3.15 UIU/ML
UROBILINOGEN URINE: NORMAL
WBC # FLD AUTO: 5.12 K/UL
WHITE BLOOD CELLS URINE: 1 /HPF

## 2023-04-06 ENCOUNTER — APPOINTMENT (OUTPATIENT)
Dept: INTERNAL MEDICINE | Facility: CLINIC | Age: 63
End: 2023-04-06
Payer: SELF-PAY

## 2023-04-06 PROCEDURE — 99499D: CUSTOM

## 2023-05-09 ENCOUNTER — APPOINTMENT (OUTPATIENT)
Dept: INTERNAL MEDICINE | Facility: CLINIC | Age: 63
End: 2023-05-09
Payer: SELF-PAY

## 2023-05-09 PROCEDURE — 99499D: CUSTOM

## 2023-05-18 ENCOUNTER — RX RENEWAL (OUTPATIENT)
Age: 63
End: 2023-05-18

## 2023-05-18 RX ORDER — PANTOPRAZOLE 40 MG/1
40 TABLET, DELAYED RELEASE ORAL
Qty: 30 | Refills: 2 | Status: ACTIVE | COMMUNITY
Start: 2017-05-04 | End: 1900-01-01

## 2023-06-05 ENCOUNTER — APPOINTMENT (OUTPATIENT)
Dept: CARDIOLOGY | Facility: CLINIC | Age: 63
End: 2023-06-05

## 2023-06-09 ENCOUNTER — APPOINTMENT (OUTPATIENT)
Dept: INTERNAL MEDICINE | Facility: CLINIC | Age: 63
End: 2023-06-09
Payer: SELF-PAY

## 2023-06-09 PROCEDURE — 99499D: CUSTOM

## 2023-07-10 ENCOUNTER — APPOINTMENT (OUTPATIENT)
Dept: INTERNAL MEDICINE | Facility: CLINIC | Age: 63
End: 2023-07-10
Payer: SELF-PAY

## 2023-07-10 PROCEDURE — 99499D: CUSTOM

## 2023-08-09 ENCOUNTER — APPOINTMENT (OUTPATIENT)
Dept: INTERNAL MEDICINE | Facility: CLINIC | Age: 63
End: 2023-08-09
Payer: SELF-PAY

## 2023-08-09 PROCEDURE — 99499D: CUSTOM

## 2023-08-16 ENCOUNTER — APPOINTMENT (OUTPATIENT)
Dept: CARDIOLOGY | Facility: CLINIC | Age: 63
End: 2023-08-16
Payer: COMMERCIAL

## 2023-08-16 VITALS
WEIGHT: 185 LBS | BODY MASS INDEX: 29.73 KG/M2 | SYSTOLIC BLOOD PRESSURE: 167 MMHG | OXYGEN SATURATION: 99 % | HEIGHT: 66 IN | HEART RATE: 58 BPM | DIASTOLIC BLOOD PRESSURE: 77 MMHG

## 2023-08-16 VITALS — SYSTOLIC BLOOD PRESSURE: 145 MMHG | DIASTOLIC BLOOD PRESSURE: 78 MMHG

## 2023-08-16 PROCEDURE — 99214 OFFICE O/P EST MOD 30 MIN: CPT | Mod: 25

## 2023-08-16 PROCEDURE — 93000 ELECTROCARDIOGRAM COMPLETE: CPT

## 2023-08-16 NOTE — HISTORY OF PRESENT ILLNESS
[FreeTextEntry1] : Jacques is a 62-year-old male here for posthospitalization follow-up.  His past medical history is notable for opioid abuse, hepatitis C, and reflux.  He presented emergency room on 11/28/2022 with midsternal chest pain and an NSTEMI.  He was transferred to [place] for cardiac catheterization, and is now status post drug-eluting stent to his LAD (x 2) and left circumflex artery, and a staged procedure on 11/30 to his RCA (PCI x 2).  I last saw him in 2/23.  He is doing well today.  He has no chest pain, difficulty breathing, or palpitations.  He is taking his medications as prescribed.  His most recent LDL notably improved on lipitor. He is back to work, and has no worrisome symptoms.  His echocardiogram in the hospital demonstrated normal left ventricular systolic function.   He is also toprol XL. He remains active without issue.

## 2023-08-16 NOTE — DISCUSSION/SUMMARY
[FreeTextEntry1] : In late 2022, Jacques presented with chest pain, and was found to have multivessel CAD.  He is now status post PCI to his LAD and left circumflex, followed by staged procedure to his RCA.  He feels quite well today without worrisome symptoms.  His EKG demonstrates a sinus rhythm, without obvious ischemia or chamber enlargement.  His echocardiogram demonstrated normal left ventricular systolic function.  He is back to life and work.  He will remain on aspirin, Brilinta (through 12/1/23), and Lipitor 80, along with Toprol. His BP is a little elevated though he has not been sleeping/under stress, and has been well controlled at home. He is going to watch his salt intake.  If no issues, I will see him again in 6 months. [EKG obtained to assist in diagnosis and management of assessed problem(s)] : EKG obtained to assist in diagnosis and management of assessed problem(s)

## 2023-09-13 ENCOUNTER — APPOINTMENT (OUTPATIENT)
Dept: INTERNAL MEDICINE | Facility: CLINIC | Age: 63
End: 2023-09-13
Payer: SELF-PAY

## 2023-09-13 PROCEDURE — 99499D: CUSTOM

## 2023-10-11 ENCOUNTER — APPOINTMENT (OUTPATIENT)
Dept: INTERNAL MEDICINE | Facility: CLINIC | Age: 63
End: 2023-10-11

## 2023-10-16 ENCOUNTER — APPOINTMENT (OUTPATIENT)
Dept: INTERNAL MEDICINE | Facility: CLINIC | Age: 63
End: 2023-10-16
Payer: SELF-PAY

## 2023-10-16 PROCEDURE — 99499D: CUSTOM

## 2023-11-15 ENCOUNTER — APPOINTMENT (OUTPATIENT)
Dept: INTERNAL MEDICINE | Facility: CLINIC | Age: 63
End: 2023-11-15
Payer: SELF-PAY

## 2023-11-15 PROCEDURE — 99499D: CUSTOM

## 2023-11-30 ENCOUNTER — RX RENEWAL (OUTPATIENT)
Age: 63
End: 2023-11-30

## 2023-12-12 ENCOUNTER — APPOINTMENT (OUTPATIENT)
Dept: INTERNAL MEDICINE | Facility: CLINIC | Age: 63
End: 2023-12-12
Payer: SELF-PAY

## 2023-12-12 PROCEDURE — 99499D: CUSTOM

## 2024-01-15 ENCOUNTER — APPOINTMENT (OUTPATIENT)
Dept: INTERNAL MEDICINE | Facility: CLINIC | Age: 64
End: 2024-01-15
Payer: SELF-PAY

## 2024-01-15 PROCEDURE — 99499D: CUSTOM

## 2024-01-26 ENCOUNTER — RX RENEWAL (OUTPATIENT)
Age: 64
End: 2024-01-26

## 2024-01-26 RX ORDER — ATORVASTATIN CALCIUM 80 MG/1
80 TABLET, FILM COATED ORAL
Qty: 30 | Refills: 5 | Status: ACTIVE | COMMUNITY
Start: 1900-01-01 | End: 1900-01-01

## 2024-02-14 ENCOUNTER — APPOINTMENT (OUTPATIENT)
Dept: INTERNAL MEDICINE | Facility: CLINIC | Age: 64
End: 2024-02-14
Payer: SELF-PAY

## 2024-02-14 PROCEDURE — 99499D: CUSTOM

## 2024-02-16 ENCOUNTER — APPOINTMENT (OUTPATIENT)
Dept: CARDIOLOGY | Facility: CLINIC | Age: 64
End: 2024-02-16
Payer: COMMERCIAL

## 2024-02-16 ENCOUNTER — NON-APPOINTMENT (OUTPATIENT)
Age: 64
End: 2024-02-16

## 2024-02-16 VITALS — SYSTOLIC BLOOD PRESSURE: 142 MMHG | DIASTOLIC BLOOD PRESSURE: 80 MMHG

## 2024-02-16 VITALS
SYSTOLIC BLOOD PRESSURE: 150 MMHG | WEIGHT: 192 LBS | OXYGEN SATURATION: 96 % | BODY MASS INDEX: 30.86 KG/M2 | HEIGHT: 66 IN | HEART RATE: 64 BPM | DIASTOLIC BLOOD PRESSURE: 77 MMHG

## 2024-02-16 DIAGNOSIS — I10 ESSENTIAL (PRIMARY) HYPERTENSION: ICD-10-CM

## 2024-02-16 DIAGNOSIS — E78.5 HYPERLIPIDEMIA, UNSPECIFIED: ICD-10-CM

## 2024-02-16 DIAGNOSIS — I25.10 ATHEROSCLEROTIC HEART DISEASE OF NATIVE CORONARY ARTERY W/OUT ANGINA PECTORIS: ICD-10-CM

## 2024-02-16 PROCEDURE — 93000 ELECTROCARDIOGRAM COMPLETE: CPT

## 2024-02-16 PROCEDURE — 99214 OFFICE O/P EST MOD 30 MIN: CPT | Mod: 25

## 2024-02-16 NOTE — HISTORY OF PRESENT ILLNESS
[FreeTextEntry1] : Jacques is a 62-year-old male here for posthospitalization follow-up.  His past medical history is notable for opioid abuse, hepatitis C, and reflux.  He presented emergency room on 11/28/2022 with midsternal chest pain and an NSTEMI.  He was transferred to [place] for cardiac catheterization, and is now status post drug-eluting stent to his LAD (x 2) and left circumflex artery, and a staged procedure on 11/30 to his RCA (PCI x 2).  I last saw him in 8/23.  He is doing well today.  He has no chest pain, difficulty breathing, or palpitations.  He is taking his medications as prescribed.  His most recent LDL notably improved on lipitor. He is back to work, and has no worrisome symptoms.  His echocardiogram in the hospital demonstrated normal left ventricular systolic function.   He is also toprol XL. He remains active without issue.

## 2024-02-16 NOTE — DISCUSSION/SUMMARY
[FreeTextEntry1] : In late 2022, Jacques presented with chest pain, and was found to have multivessel CAD.  He is now status post PCI to his LAD and left circumflex, followed by staged procedure to his RCA.  He feels quite well today without worrisome symptoms.  His EKG demonstrates a sinus rhythm, without obvious ischemia or chamber enlargement.   He is back to life and work.  He will remain on aspirin and Lipitor 80, along with Toprol. His BP is a little elevated though he has not been sleeping/under stress, and has been well controlled at other MD visits. He is going to watch his salt intake.  He is going to have an echocardiogram to confirm normal LV function and a full set of BW. If no issues, I will see him again in 6 months. [EKG obtained to assist in diagnosis and management of assessed problem(s)] : EKG obtained to assist in diagnosis and management of assessed problem(s)

## 2024-03-01 ENCOUNTER — APPOINTMENT (OUTPATIENT)
Dept: CARDIOLOGY | Facility: CLINIC | Age: 64
End: 2024-03-01
Payer: COMMERCIAL

## 2024-03-01 LAB
BASOPHILS # BLD AUTO: 0.02 K/UL
BASOPHILS NFR BLD AUTO: 0.5 %
EOSINOPHIL # BLD AUTO: 0.18 K/UL
EOSINOPHIL NFR BLD AUTO: 4.1 %
HCT VFR BLD CALC: 46.3 %
HGB BLD-MCNC: 16.1 G/DL
IMM GRANULOCYTES NFR BLD AUTO: 0 %
LYMPHOCYTES # BLD AUTO: 1.16 K/UL
LYMPHOCYTES NFR BLD AUTO: 26.4 %
MAN DIFF?: NORMAL
MCHC RBC-ENTMCNC: 32.1 PG
MCHC RBC-ENTMCNC: 34.8 GM/DL
MCV RBC AUTO: 92.4 FL
MONOCYTES # BLD AUTO: 0.48 K/UL
MONOCYTES NFR BLD AUTO: 10.9 %
NEUTROPHILS # BLD AUTO: 2.56 K/UL
NEUTROPHILS NFR BLD AUTO: 58.1 %
PLATELET # BLD AUTO: 159 K/UL
RBC # BLD: 5.01 M/UL
RBC # FLD: 12.4 %
WBC # FLD AUTO: 4.4 K/UL

## 2024-03-01 PROCEDURE — 93306 TTE W/DOPPLER COMPLETE: CPT

## 2024-03-02 LAB
ALBUMIN SERPL ELPH-MCNC: 4.6 G/DL
ALP BLD-CCNC: 83 U/L
ALT SERPL-CCNC: 41 U/L
ANION GAP SERPL CALC-SCNC: 10 MMOL/L
APPEARANCE: CLEAR
AST SERPL-CCNC: 31 U/L
BACTERIA: NEGATIVE /HPF
BILIRUB SERPL-MCNC: 0.6 MG/DL
BILIRUBIN URINE: NEGATIVE
BLOOD URINE: NEGATIVE
BUN SERPL-MCNC: 20 MG/DL
CALCIUM SERPL-MCNC: 9.3 MG/DL
CAST: 0 /LPF
CHLORIDE SERPL-SCNC: 104 MMOL/L
CHOLEST SERPL-MCNC: 115 MG/DL
CO2 SERPL-SCNC: 29 MMOL/L
COLOR: YELLOW
CREAT SERPL-MCNC: 0.81 MG/DL
EGFR: 98 ML/MIN/1.73M2
EPITHELIAL CELLS: 0 /HPF
ESTIMATED AVERAGE GLUCOSE: 105 MG/DL
GLUCOSE QUALITATIVE U: NEGATIVE MG/DL
GLUCOSE SERPL-MCNC: 115 MG/DL
HBA1C MFR BLD HPLC: 5.3 %
HDLC SERPL-MCNC: 47 MG/DL
KETONES URINE: NEGATIVE MG/DL
LDLC SERPL CALC-MCNC: 56 MG/DL
LEUKOCYTE ESTERASE URINE: NEGATIVE
MICROSCOPIC-UA: NORMAL
NITRITE URINE: NEGATIVE
NONHDLC SERPL-MCNC: 68 MG/DL
PH URINE: 5.5
POTASSIUM SERPL-SCNC: 4.5 MMOL/L
PROT SERPL-MCNC: 6.9 G/DL
PROTEIN URINE: NORMAL MG/DL
RED BLOOD CELLS URINE: 2 /HPF
SODIUM SERPL-SCNC: 143 MMOL/L
SPECIFIC GRAVITY URINE: 1.02
TRIGL SERPL-MCNC: 54 MG/DL
TSH SERPL-ACNC: 1.64 UIU/ML
UROBILINOGEN URINE: 0.2 MG/DL
WHITE BLOOD CELLS URINE: 0 /HPF

## 2024-03-11 ENCOUNTER — APPOINTMENT (OUTPATIENT)
Dept: INTERNAL MEDICINE | Facility: CLINIC | Age: 64
End: 2024-03-11
Payer: SELF-PAY

## 2024-03-11 PROCEDURE — 99499D: CUSTOM

## 2024-04-04 NOTE — ED PROVIDER NOTE - GASTROINTESTINAL NEGATIVE STATEMENT, MLM
HPI:  Leann Collins is a 2 y.o. 11 m.o. female who presents with illness.  History was given by mom.  She has a rash all over her body and sore throat.  Last month, had a suspected sinusitis but didn't take amox.  She now has a sore throat, doesn't want to eat.  Rash as well, all over, gmom thought maybe from fleas?  Itchy and draining lesions at times.  Feels warm at night.    She also has a scaly scalp and itchy skin at baseline.      Past Medical History:   Diagnosis Date    Body mass index, pediatric, greater than or equal to 95th percentile for age 5/10/2023       History reviewed. No pertinent surgical history.    Family History   Problem Relation Age of Onset    No Known Problems Mother     No Known Problems Father     No Known Problems Brother     No Known Problems Maternal Grandmother     No Known Problems Maternal Grandfather     Arthritis Paternal Grandmother     No Known Problems Paternal Grandfather        Social History     Socioeconomic History    Marital status: Single   Tobacco Use    Smoking status: Never     Passive exposure: Yes    Smokeless tobacco: Never   Substance and Sexual Activity    Alcohol use: Never    Drug use: Never    Sexual activity: Never   Social History Narrative    Lives at home with mom and grandma , no pets , no smokers.  In  4/24.       Patient Active Problem List   Diagnosis    Body mass index, pediatric, greater than or equal to 95th percentile for age       Reviewed Past Medical History, Social History, and Family History-- reviewed and updated as needed    ROS:  Constitutional: no decreased activity  Head, Ears, Eyes, Nose, Throat: no ear discharge  Respiratory: no difficulty breathing  GI: no vomiting or diarrhea    PHYSICAL EXAM:  APPEARANCE: No acute distress, nontoxic appearing  SKIN: bullous impetigo lesions with weeping on thighs/ legs/ arms; abdomen below umbilicus has area of eczema with excoriations; scalp is scaly throughout but no hair loss  HEAD:  Nontraumatic  NECK: Supple  EYES: Conjunctivae clear, no discharge  EARS: Clear canals, Tympanic membranes pearly bilaterally  NOSE: thick yellow discharge  MOUTH & THROAT:  Moist mucous membranes, No tonsillar enlargement, +diffuse pharyngeal erythema w/o exudates  CHEST: Lungs clear to auscultation, no grunting/flaring/retracting  CARDIOVASCULAR: Regular rate and rhythm without murmur, capillary refill less than 2 seconds  GI: Soft, non tender, non distended, no hepatosplenomegaly  MUSCULOSKELETAL: Moves all extremities well  NEUROLOGIC: alert, interactive      Leann was seen today for rash and sore throat.    Diagnoses and all orders for this visit:    Acute streptococcal pharyngitis  -     POCT Strep A, Molecular  -     cephALEXin (KEFLEX) 250 mg/5 mL suspension; Take 10 mLs (500 mg total) by mouth 2 (two) times a day. for 10 days    Impetigo  -     cephALEXin (KEFLEX) 250 mg/5 mL suspension; Take 10 mLs (500 mg total) by mouth 2 (two) times a day. for 10 days  -     mupirocin (BACTROBAN) 2 % ointment; Apply topically 3 (three) times daily. Apply to affected area TID for 14 days    Eczema, unspecified type  -     hydrocortisone 2.5 % ointment; Apply topically 2 (two) times daily. Use from neck down twice daily; can use on face once daily up to 7 days for 10 days    Seborrhea  -     ketoconazole (NIZORAL) 2 % shampoo; Apply topically twice a week.          ASSESSMENT:  1. Acute streptococcal pharyngitis    2. Impetigo    3. Eczema, unspecified type    4. Seborrhea        PLAN:  RSS+.   For strep throat, take antibiotics (cephalexin since also treating impetigo below) for 10 days.  Change out toothbrush.  Push fluids.  Ibuprofen as needed for fever, sore throat.  If worsening symptoms, difficulty swallowing, fever over 101 for more than 5 days, return to clinic/seek care.      For bullous impetigo, take cephalexin by mouth and use topical mupirocin ointment three times/day on the open sores.  To prevent can use 1  capful of clorox in bathwater a few times/week.  If worsening or spreading, call or return to clinic.     For seborrhea scaling of scalp, use ketoconazole shampoo twice weekly.    For eczema, use dove sensitive skin soap, Dove baby, Eucerin baby, Cerave, or Aveeno creamy baby body wash to bathe once daily (cleanser should be fragrance/dye free); bathe in warm water for <10 minutes.  Moisturize skin with vaseline, Cerave cream, Aveeno eczema cream, or eucerin cream several times daily for lubrication.  Use hydrocortisone 2.5% ointment twice daily from neck down for flares; only use once daily on face if needed up to 7 days.  Wash clothes in fragrance free detergent such as All Free & Clear, Tide Free, etc.  If eczema is getting infected often, can add 1 capful of bleach to bathwater a few times/week.     Visit >30 min due to multiple issues addressed.   no abdominal pain, no bloating, no constipation, no diarrhea, no nausea and no vomiting.

## 2024-04-17 ENCOUNTER — APPOINTMENT (OUTPATIENT)
Dept: INTERNAL MEDICINE | Facility: CLINIC | Age: 64
End: 2024-04-17
Payer: SELF-PAY

## 2024-04-17 PROCEDURE — 99499D: CUSTOM

## 2024-05-15 ENCOUNTER — APPOINTMENT (OUTPATIENT)
Dept: INTERNAL MEDICINE | Facility: CLINIC | Age: 64
End: 2024-05-15
Payer: SELF-PAY

## 2024-05-15 PROCEDURE — 99499D: CUSTOM

## 2024-05-15 RX ORDER — NALOXONE HYDROCHLORIDE 4 MG/.1ML
4 SPRAY NASAL
Qty: 1 | Refills: 0 | Status: ACTIVE | COMMUNITY
Start: 2022-07-05 | End: 1900-01-01

## 2024-05-20 RX ORDER — BUPRENORPHINE AND NALOXONE 2; .5 MG/1; MG/1
2-0.5 FILM BUCCAL; SUBLINGUAL
Qty: 90 | Refills: 0 | Status: ACTIVE | COMMUNITY
Start: 2015-09-08 | End: 1900-01-01

## 2024-05-23 ENCOUNTER — TRANSCRIPTION ENCOUNTER (OUTPATIENT)
Age: 64
End: 2024-05-23

## 2024-05-31 ENCOUNTER — RX RENEWAL (OUTPATIENT)
Age: 64
End: 2024-05-31

## 2024-05-31 RX ORDER — METOPROLOL SUCCINATE 25 MG/1
25 TABLET, EXTENDED RELEASE ORAL
Qty: 30 | Refills: 5 | Status: ACTIVE | COMMUNITY
Start: 2022-12-16 | End: 1900-01-01

## 2024-07-15 ENCOUNTER — APPOINTMENT (OUTPATIENT)
Dept: INTERNAL MEDICINE | Facility: CLINIC | Age: 64
End: 2024-07-15
Payer: SELF-PAY

## 2024-07-15 DIAGNOSIS — F11.20 OPIOID DEPENDENCE, UNCOMPLICATED: ICD-10-CM

## 2024-07-15 PROCEDURE — 99499D: CUSTOM

## 2024-07-31 ENCOUNTER — RX RENEWAL (OUTPATIENT)
Age: 64
End: 2024-07-31

## 2024-08-09 ENCOUNTER — APPOINTMENT (OUTPATIENT)
Dept: CARDIOLOGY | Facility: CLINIC | Age: 64
End: 2024-08-09

## 2024-08-15 ENCOUNTER — APPOINTMENT (OUTPATIENT)
Dept: INTERNAL MEDICINE | Facility: CLINIC | Age: 64
End: 2024-08-15
Payer: SELF-PAY

## 2024-08-15 PROCEDURE — 99499D: CUSTOM

## 2024-09-11 ENCOUNTER — APPOINTMENT (OUTPATIENT)
Dept: INTERNAL MEDICINE | Facility: CLINIC | Age: 64
End: 2024-09-11

## 2024-09-12 ENCOUNTER — APPOINTMENT (OUTPATIENT)
Dept: INTERNAL MEDICINE | Facility: CLINIC | Age: 64
End: 2024-09-12
Payer: SELF-PAY

## 2024-09-12 PROCEDURE — 99499D: CUSTOM

## 2024-09-26 ENCOUNTER — NON-APPOINTMENT (OUTPATIENT)
Age: 64
End: 2024-09-26

## 2024-09-26 ENCOUNTER — APPOINTMENT (OUTPATIENT)
Dept: CARDIOLOGY | Facility: CLINIC | Age: 64
End: 2024-09-26
Payer: COMMERCIAL

## 2024-09-26 VITALS
HEIGHT: 66 IN | WEIGHT: 190 LBS | SYSTOLIC BLOOD PRESSURE: 125 MMHG | BODY MASS INDEX: 30.53 KG/M2 | DIASTOLIC BLOOD PRESSURE: 77 MMHG | OXYGEN SATURATION: 98 % | HEART RATE: 64 BPM

## 2024-09-26 DIAGNOSIS — E78.5 HYPERLIPIDEMIA, UNSPECIFIED: ICD-10-CM

## 2024-09-26 DIAGNOSIS — I25.10 ATHEROSCLEROTIC HEART DISEASE OF NATIVE CORONARY ARTERY W/OUT ANGINA PECTORIS: ICD-10-CM

## 2024-09-26 DIAGNOSIS — Z95.5 PRESENCE OF CORONARY ANGIOPLASTY IMPLANT AND GRAFT: ICD-10-CM

## 2024-09-26 PROCEDURE — 93000 ELECTROCARDIOGRAM COMPLETE: CPT

## 2024-09-26 PROCEDURE — 99214 OFFICE O/P EST MOD 30 MIN: CPT | Mod: 25

## 2024-09-27 NOTE — CARDIOLOGY SUMMARY
[de-identified] : 12/16/22: sr 9/26/24: sinus jim [de-identified] : 3/2024: LVEF 57%, NML LV/RV, mild post MAC [de-identified] : 11/28/2022: DEDRICK to LAD x2 and LCX 11/30/2022: Staged PCI/DEDRICK x2 to RCA

## 2024-09-27 NOTE — CARDIOLOGY SUMMARY
[de-identified] : 12/16/22: sr 9/26/24: sinus jim [de-identified] : 3/2024: LVEF 57%, NML LV/RV, mild post MAC [de-identified] : 11/28/2022: DEDRICK to LAD x2 and LCX 11/30/2022: Staged PCI/DEDRICK x2 to RCA

## 2024-09-27 NOTE — ADDENDUM
[FreeTextEntry1] : overall doing well for nuc stress to evaluate residual cad cont current med regimen

## 2024-09-27 NOTE — HISTORY OF PRESENT ILLNESS
[FreeTextEntry1] : Jacques is a 63-year-old male here for routine CV follow-up.  His past medical history is notable for opioid abuse, hepatitis C, and reflux.  He presented emergency room on 11/28/2022 with midsternal chest pain and an NSTEMI.  He was transferred to [place] for cardiac catheterization, and is now status post drug-eluting stent to his LAD (x 2) and left circumflex artery, and a staged procedure on 11/30 to his RCA (PCI x 2). His echocardiogram in the hospital demonstrated normal left ventricular systolic function.    He is doing well today.  He was last seen in February 2024.  He is preparing to retire at the end of October. He does not sleep well and hoping things will change for him for the better after his shelter. He has no chest pain, difficulty breathing, or palpitations.  He is taking his medications as prescribed.  His most recent LDL notably improved on lipitor.

## 2024-09-27 NOTE — PHYSICAL EXAM
[Well Developed] : well developed [Well Nourished] : well nourished [No Acute Distress] : no acute distress [Normal Conjunctiva] : normal conjunctiva [Normal Venous Pressure] : normal venous pressure [No Carotid Bruit] : no carotid bruit [Normal S1, S2] : normal S1, S2 [No Rub] : no rub [No Gallop] : no gallop [Clear Lung Fields] : clear lung fields [Good Air Entry] : good air entry [No Respiratory Distress] : no respiratory distress  [Soft] : abdomen soft [Non Tender] : non-tender [No Masses/organomegaly] : no masses/organomegaly [Normal Bowel Sounds] : normal bowel sounds [Normal Gait] : normal gait [No Edema] : no edema [No Cyanosis] : no cyanosis [No Clubbing] : no clubbing [No Varicosities] : no varicosities [No Rash] : no rash [No Skin Lesions] : no skin lesions [Moves all extremities] : moves all extremities [No Focal Deficits] : no focal deficits [Normal Speech] : normal speech [Alert and Oriented] : alert and oriented [Normal memory] : normal memory [Rhythm Regular] : regular [Normal S1] : normal S1 [Normal S2] : normal S2 [No Murmur] : no murmurs heard [No Pitting Edema] : no pitting edema present [No Abnormalities] : the abdominal aorta was not enlarged and no bruit was heard [Right Carotid Bruit] : no bruit heard over the right carotid [Left Carotid Bruit] : no bruit heard over the left carotid

## 2024-09-27 NOTE — DISCUSSION/SUMMARY
[EKG obtained to assist in diagnosis and management of assessed problem(s)] : EKG obtained to assist in diagnosis and management of assessed problem(s) [FreeTextEntry1] : In late 2022, Jacques presented with chest pain, and was found to have multivessel CAD.  He is now status post PCI to his LAD and left circumflex, followed by staged procedure to his RCA. He arrives for routine follow up and CV management.  He feels quite well today without worrisome symptoms.  His EKG demonstrates a sinus rhythm, without obvious ischemia or chamber enlargement.  His blood pressure is controlled.  He had a precautionary echocardiogram done in 3/2024 demonstrating normal LVEF without wall motion abnormalities. Because he is 2 years from coronary stent placement,  I have advised that he undergo precautionary nuclear stress testing to evaluate for new or worsening obstructive disease.   He will remain on aspirin for 2nd prevention.  His latest LDL was 56, he can reduce atorvastatin to 40mg from 80mg at this time.  Continue Toprol 25mg for HR and B/P control.  I will call him with results of the stress testing once complete. If no issues, I will see him again in 6 months.

## 2024-09-27 NOTE — HISTORY OF PRESENT ILLNESS
[FreeTextEntry1] : Jacques is a 63-year-old male here for routine CV follow-up.  His past medical history is notable for opioid abuse, hepatitis C, and reflux.  He presented emergency room on 11/28/2022 with midsternal chest pain and an NSTEMI.  He was transferred to [place] for cardiac catheterization, and is now status post drug-eluting stent to his LAD (x 2) and left circumflex artery, and a staged procedure on 11/30 to his RCA (PCI x 2). His echocardiogram in the hospital demonstrated normal left ventricular systolic function.    He is doing well today.  He was last seen in February 2024.  He is preparing to retire at the end of October. He does not sleep well and hoping things will change for him for the better after his half-way. He has no chest pain, difficulty breathing, or palpitations.  He is taking his medications as prescribed.  His most recent LDL notably improved on lipitor.

## 2024-09-30 ENCOUNTER — NON-APPOINTMENT (OUTPATIENT)
Age: 64
End: 2024-09-30

## 2024-10-02 ENCOUNTER — APPOINTMENT (OUTPATIENT)
Dept: CARDIOLOGY | Facility: CLINIC | Age: 64
End: 2024-10-02
Payer: COMMERCIAL

## 2024-10-02 PROCEDURE — A9500: CPT

## 2024-10-02 PROCEDURE — 78452 HT MUSCLE IMAGE SPECT MULT: CPT

## 2024-10-02 PROCEDURE — 93015 CV STRESS TEST SUPVJ I&R: CPT

## 2024-10-07 ENCOUNTER — NON-APPOINTMENT (OUTPATIENT)
Age: 64
End: 2024-10-07

## 2024-10-09 NOTE — PATIENT PROFILE ADULT - HOW PATIENT ADDRESSED, PROFILE
[facility] Living Authorization    The [facility] Review Committee has reviewed this case and the patient IS APPROVED for discharge to a facility for Short Term Skilled once the following procedure is followed:     - The physician discharge instructions (contained within the IRENE note for SNF) must inlcude the below appropriate and approved COVID instructions to the facility    For questions regarding C[employer] approval process, please contact the CM assigned to the case.  For questions regarding RN discharge workflow, please contact the unit Clinical Leader.   Jacques

## 2024-10-14 ENCOUNTER — APPOINTMENT (OUTPATIENT)
Dept: INTERNAL MEDICINE | Facility: CLINIC | Age: 64
End: 2024-10-14
Payer: SELF-PAY

## 2024-10-14 PROCEDURE — 99499D: CUSTOM

## 2024-11-15 ENCOUNTER — APPOINTMENT (OUTPATIENT)
Dept: INTERNAL MEDICINE | Facility: CLINIC | Age: 64
End: 2024-11-15
Payer: SELF-PAY

## 2024-11-15 PROCEDURE — 99499D: CUSTOM

## 2024-11-26 ENCOUNTER — APPOINTMENT (OUTPATIENT)
Dept: HEPATOLOGY | Facility: CLINIC | Age: 64
End: 2024-11-26
Payer: COMMERCIAL

## 2024-11-26 VITALS
SYSTOLIC BLOOD PRESSURE: 152 MMHG | HEART RATE: 68 BPM | OXYGEN SATURATION: 98 % | HEIGHT: 66 IN | DIASTOLIC BLOOD PRESSURE: 73 MMHG | WEIGHT: 192 LBS | TEMPERATURE: 97.5 F | BODY MASS INDEX: 30.86 KG/M2

## 2024-11-26 DIAGNOSIS — B19.20 UNSPECIFIED VIRAL HEPATITIS C W/OUT HEPATIC COMA: ICD-10-CM

## 2024-11-26 LAB
ALBUMIN SERPL ELPH-MCNC: 4.7 G/DL
ALP BLD-CCNC: 91 U/L
ALT SERPL-CCNC: 34 U/L
ANION GAP SERPL CALC-SCNC: 16 MMOL/L
AST SERPL-CCNC: 27 U/L
BILIRUB SERPL-MCNC: 0.4 MG/DL
BUN SERPL-MCNC: 20 MG/DL
CALCIUM SERPL-MCNC: 9.6 MG/DL
CHLORIDE SERPL-SCNC: 103 MMOL/L
CO2 SERPL-SCNC: 24 MMOL/L
CREAT SERPL-MCNC: 0.84 MG/DL
EGFR: 97 ML/MIN/1.73M2
GLUCOSE SERPL-MCNC: 114 MG/DL
HCV RNA SERPL NAA+PROBE-LOG IU: NOT DETECTED LOGIU/ML
HEPC RNA INTERP: NOT DETECTED
INR PPP: 0.89 RATIO
POTASSIUM SERPL-SCNC: 4.5 MMOL/L
PROT SERPL-MCNC: 6.8 G/DL
PT BLD: 10.5 SEC
SODIUM SERPL-SCNC: 142 MMOL/L

## 2024-11-26 PROCEDURE — 99203 OFFICE O/P NEW LOW 30 MIN: CPT

## 2024-12-06 ENCOUNTER — RX RENEWAL (OUTPATIENT)
Age: 64
End: 2024-12-06

## 2024-12-10 ENCOUNTER — APPOINTMENT (OUTPATIENT)
Dept: HEPATOLOGY | Facility: CLINIC | Age: 64
End: 2024-12-10

## 2024-12-10 PROCEDURE — 76981 USE PARENCHYMA: CPT

## 2024-12-13 ENCOUNTER — APPOINTMENT (OUTPATIENT)
Dept: INTERNAL MEDICINE | Facility: CLINIC | Age: 64
End: 2024-12-13
Payer: SELF-PAY

## 2024-12-13 PROCEDURE — 99499D: CUSTOM

## 2024-12-24 PROBLEM — R52 BODY ACHES: Status: RESOLVED | Noted: 2019-03-07 | Resolved: 2024-12-24

## 2024-12-26 NOTE — ED ADULT NURSE NOTE - ABDOMEN
Patient has Abnormal Magnesium: hypomagnesemia. Will continue to monitor electrolytes closely. Will replace the affected electrolytes and repeat labs to be done after interventions completed. The patient's magnesium results have been reviewed and are listed below.  Recent Labs   Lab 12/26/24  0500   MG 1.6      12/17 mag imrpoving - repalce mag today  12/19 replace mag today  12/20 mag oxide bid  12/21 2g Mg  12/23 dose of iv mag today to keep mag level about 2  12/25 FM:  Replace, recheck.   soft/nondistended

## 2025-01-10 ENCOUNTER — APPOINTMENT (OUTPATIENT)
Dept: INTERNAL MEDICINE | Facility: CLINIC | Age: 65
End: 2025-01-10
Payer: SELF-PAY

## 2025-01-10 PROCEDURE — 99499D: CUSTOM

## 2025-02-18 ENCOUNTER — APPOINTMENT (OUTPATIENT)
Dept: INTERNAL MEDICINE | Facility: CLINIC | Age: 65
End: 2025-02-18
Payer: SELF-PAY

## 2025-02-18 PROCEDURE — 99499D: CUSTOM

## 2025-03-14 ENCOUNTER — APPOINTMENT (OUTPATIENT)
Dept: INTERNAL MEDICINE | Facility: CLINIC | Age: 65
End: 2025-03-14
Payer: SELF-PAY

## 2025-03-14 PROCEDURE — 99499D: CUSTOM

## 2025-03-24 ENCOUNTER — NON-APPOINTMENT (OUTPATIENT)
Age: 65
End: 2025-03-24

## 2025-03-24 ENCOUNTER — APPOINTMENT (OUTPATIENT)
Dept: CARDIOLOGY | Facility: CLINIC | Age: 65
End: 2025-03-24
Payer: COMMERCIAL

## 2025-03-24 VITALS
SYSTOLIC BLOOD PRESSURE: 177 MMHG | DIASTOLIC BLOOD PRESSURE: 77 MMHG | BODY MASS INDEX: 32.47 KG/M2 | HEIGHT: 66 IN | HEART RATE: 57 BPM | WEIGHT: 202 LBS | OXYGEN SATURATION: 97 %

## 2025-03-24 VITALS — SYSTOLIC BLOOD PRESSURE: 158 MMHG | DIASTOLIC BLOOD PRESSURE: 78 MMHG

## 2025-03-24 DIAGNOSIS — I10 ESSENTIAL (PRIMARY) HYPERTENSION: ICD-10-CM

## 2025-03-24 DIAGNOSIS — I25.10 ATHEROSCLEROTIC HEART DISEASE OF NATIVE CORONARY ARTERY W/OUT ANGINA PECTORIS: ICD-10-CM

## 2025-03-24 DIAGNOSIS — E78.5 HYPERLIPIDEMIA, UNSPECIFIED: ICD-10-CM

## 2025-03-24 PROCEDURE — 93000 ELECTROCARDIOGRAM COMPLETE: CPT

## 2025-03-24 PROCEDURE — 99214 OFFICE O/P EST MOD 30 MIN: CPT | Mod: 25

## 2025-04-04 LAB
ALBUMIN SERPL ELPH-MCNC: 4.3 G/DL
ALP BLD-CCNC: 90 U/L
ALT SERPL-CCNC: 33 U/L
ANION GAP SERPL CALC-SCNC: 11 MMOL/L
AST SERPL-CCNC: 27 U/L
BILIRUB SERPL-MCNC: 0.4 MG/DL
BUN SERPL-MCNC: 25 MG/DL
CALCIUM SERPL-MCNC: 9.1 MG/DL
CHLORIDE SERPL-SCNC: 103 MMOL/L
CHOLEST SERPL-MCNC: 125 MG/DL
CO2 SERPL-SCNC: 27 MMOL/L
CREAT SERPL-MCNC: 0.92 MG/DL
EGFRCR SERPLBLD CKD-EPI 2021: 92 ML/MIN/1.73M2
GLUCOSE SERPL-MCNC: 114 MG/DL
HDLC SERPL-MCNC: 47 MG/DL
LDLC SERPL-MCNC: 64 MG/DL
NONHDLC SERPL-MCNC: 78 MG/DL
POTASSIUM SERPL-SCNC: 4.2 MMOL/L
PROT SERPL-MCNC: 6.5 G/DL
SODIUM SERPL-SCNC: 141 MMOL/L
TRIGL SERPL-MCNC: 68 MG/DL
TSH SERPL-ACNC: 3.26 UIU/ML

## 2025-04-06 LAB
ESTIMATED AVERAGE GLUCOSE: 108 MG/DL
HBA1C MFR BLD HPLC: 5.4 %

## 2025-04-16 ENCOUNTER — APPOINTMENT (OUTPATIENT)
Dept: INTERNAL MEDICINE | Facility: CLINIC | Age: 65
End: 2025-04-16
Payer: SELF-PAY

## 2025-04-16 PROCEDURE — 99499D: CUSTOM

## 2025-05-21 ENCOUNTER — APPOINTMENT (OUTPATIENT)
Dept: INTERNAL MEDICINE | Facility: CLINIC | Age: 65
End: 2025-05-21
Payer: SELF-PAY

## 2025-05-21 DIAGNOSIS — F11.20 OPIOID DEPENDENCE, UNCOMPLICATED: ICD-10-CM

## 2025-05-21 PROCEDURE — 99499D: CUSTOM
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
## 2025-06-18 ENCOUNTER — APPOINTMENT (OUTPATIENT)
Dept: INTERNAL MEDICINE | Facility: CLINIC | Age: 65
End: 2025-06-18
Payer: SELF-PAY

## 2025-06-18 PROCEDURE — 99499D: CUSTOM

## 2025-07-22 ENCOUNTER — APPOINTMENT (OUTPATIENT)
Dept: INTERNAL MEDICINE | Facility: CLINIC | Age: 65
End: 2025-07-22
Payer: SELF-PAY

## 2025-07-22 PROCEDURE — 99499D: CUSTOM

## 2025-08-08 ENCOUNTER — APPOINTMENT (OUTPATIENT)
Dept: ORTHOPEDIC SURGERY | Facility: CLINIC | Age: 65
End: 2025-08-08
Payer: COMMERCIAL

## 2025-08-08 VITALS — WEIGHT: 202 LBS | HEIGHT: 66 IN | BODY MASS INDEX: 32.47 KG/M2

## 2025-08-08 DIAGNOSIS — M19.011 PRIMARY OSTEOARTHRITIS, RIGHT SHOULDER: ICD-10-CM

## 2025-08-08 DIAGNOSIS — M19.012 PRIMARY OSTEOARTHRITIS, RIGHT SHOULDER: ICD-10-CM

## 2025-08-08 PROCEDURE — 99203 OFFICE O/P NEW LOW 30 MIN: CPT | Mod: 25

## 2025-08-08 PROCEDURE — 73030 X-RAY EXAM OF SHOULDER: CPT | Mod: 50

## 2025-08-08 PROCEDURE — 73010 X-RAY EXAM OF SHOULDER BLADE: CPT | Mod: 50

## 2025-08-25 ENCOUNTER — APPOINTMENT (OUTPATIENT)
Dept: INTERNAL MEDICINE | Facility: CLINIC | Age: 65
End: 2025-08-25
Payer: SELF-PAY

## 2025-08-25 PROCEDURE — 99499D: CUSTOM
